# Patient Record
Sex: MALE | Race: WHITE | NOT HISPANIC OR LATINO | Employment: OTHER | URBAN - NONMETROPOLITAN AREA
[De-identification: names, ages, dates, MRNs, and addresses within clinical notes are randomized per-mention and may not be internally consistent; named-entity substitution may affect disease eponyms.]

---

## 2022-04-12 RX ORDER — ROSUVASTATIN CALCIUM 20 MG/1
TABLET, COATED ORAL
Qty: 30 TABLET | Refills: 0 | OUTPATIENT
Start: 2022-04-12

## 2022-04-20 RX ORDER — CYCLOBENZAPRINE HCL 10 MG
TABLET ORAL
Qty: 30 TABLET | Refills: 0 | OUTPATIENT
Start: 2022-04-20

## 2022-08-18 RX ORDER — OMEPRAZOLE 40 MG/1
CAPSULE, DELAYED RELEASE ORAL
Qty: 30 CAPSULE | Refills: 5 | Status: SHIPPED | OUTPATIENT
Start: 2022-08-18 | End: 2022-09-28

## 2022-08-22 RX ORDER — FLUOXETINE HYDROCHLORIDE 20 MG/1
CAPSULE ORAL
Qty: 90 CAPSULE | Refills: 1 | Status: SHIPPED | OUTPATIENT
Start: 2022-08-22 | End: 2022-11-15

## 2022-08-22 RX ORDER — LOSARTAN POTASSIUM AND HYDROCHLOROTHIAZIDE 12.5; 5 MG/1; MG/1
TABLET ORAL
Qty: 90 TABLET | Refills: 1 | Status: SHIPPED | OUTPATIENT
Start: 2022-08-22 | End: 2022-09-29

## 2022-09-13 RX ORDER — ROSUVASTATIN CALCIUM 20 MG/1
TABLET, COATED ORAL
Qty: 30 TABLET | Refills: 1 | Status: SHIPPED | OUTPATIENT
Start: 2022-09-13 | End: 2022-11-15

## 2022-09-28 RX ORDER — OMEPRAZOLE 40 MG/1
CAPSULE, DELAYED RELEASE ORAL
Qty: 30 CAPSULE | Refills: 5 | Status: SHIPPED | OUTPATIENT
Start: 2022-09-28

## 2022-09-29 RX ORDER — LOSARTAN POTASSIUM AND HYDROCHLOROTHIAZIDE 12.5; 5 MG/1; MG/1
TABLET ORAL
Qty: 30 TABLET | Refills: 0 | Status: SHIPPED | OUTPATIENT
Start: 2022-09-29

## 2022-10-31 RX ORDER — CYCLOBENZAPRINE HCL 10 MG
TABLET ORAL
Qty: 30 TABLET | Refills: 0 | OUTPATIENT
Start: 2022-10-31

## 2022-11-01 RX ORDER — ROSUVASTATIN CALCIUM 20 MG/1
TABLET, COATED ORAL
Qty: 30 TABLET | Refills: 0 | OUTPATIENT
Start: 2022-11-01

## 2022-11-15 RX ORDER — FLUOXETINE HYDROCHLORIDE 20 MG/1
CAPSULE ORAL
Qty: 90 CAPSULE | Refills: 0 | Status: SHIPPED | OUTPATIENT
Start: 2022-11-15 | End: 2022-12-23

## 2022-11-15 RX ORDER — ROSUVASTATIN CALCIUM 20 MG/1
TABLET, COATED ORAL
Qty: 30 TABLET | Refills: 0 | Status: SHIPPED | OUTPATIENT
Start: 2022-11-15 | End: 2022-11-28

## 2022-11-28 RX ORDER — ROSUVASTATIN CALCIUM 20 MG/1
TABLET, COATED ORAL
Qty: 30 TABLET | Refills: 3 | Status: SHIPPED | OUTPATIENT
Start: 2022-11-28

## 2022-12-13 RX ORDER — FLUOXETINE HYDROCHLORIDE 20 MG/1
CAPSULE ORAL
Qty: 90 CAPSULE | Refills: 0 | OUTPATIENT
Start: 2022-12-13

## 2022-12-23 RX ORDER — FLUOXETINE HYDROCHLORIDE 20 MG/1
CAPSULE ORAL
Qty: 90 CAPSULE | Refills: 0 | Status: SHIPPED | OUTPATIENT
Start: 2022-12-23 | End: 2023-01-12

## 2023-01-12 RX ORDER — FLUOXETINE HYDROCHLORIDE 20 MG/1
CAPSULE ORAL
Qty: 90 CAPSULE | Refills: 0 | Status: SHIPPED | OUTPATIENT
Start: 2023-01-12 | End: 2023-01-24

## 2023-01-24 RX ORDER — FLUOXETINE HYDROCHLORIDE 20 MG/1
CAPSULE ORAL
Qty: 90 CAPSULE | Refills: 0 | Status: SHIPPED | OUTPATIENT
Start: 2023-01-24 | End: 2023-03-13

## 2023-03-13 RX ORDER — FLUOXETINE HYDROCHLORIDE 20 MG/1
CAPSULE ORAL
Qty: 90 CAPSULE | Refills: 0 | Status: SHIPPED | OUTPATIENT
Start: 2023-03-13

## 2023-05-03 RX ORDER — FLUOXETINE HYDROCHLORIDE 20 MG/1
CAPSULE ORAL
Qty: 90 CAPSULE | Refills: 0 | OUTPATIENT
Start: 2023-05-03

## 2023-05-16 RX ORDER — LOSARTAN POTASSIUM AND HYDROCHLOROTHIAZIDE 12.5; 5 MG/1; MG/1
TABLET ORAL
Qty: 90 TABLET | Refills: 0 | OUTPATIENT
Start: 2023-05-16

## 2023-05-16 RX ORDER — ROSUVASTATIN CALCIUM 20 MG/1
TABLET, COATED ORAL
Qty: 30 TABLET | Refills: 0 | OUTPATIENT
Start: 2023-05-16

## 2023-05-22 RX ORDER — FLUOXETINE HYDROCHLORIDE 20 MG/1
CAPSULE ORAL
Qty: 90 CAPSULE | Refills: 0 | OUTPATIENT
Start: 2023-05-22

## 2023-05-22 RX ORDER — LOSARTAN POTASSIUM AND HYDROCHLOROTHIAZIDE 12.5; 5 MG/1; MG/1
1 TABLET ORAL DAILY
Qty: 30 TABLET | Refills: 0 | OUTPATIENT
Start: 2023-05-22

## 2023-05-22 NOTE — TELEPHONE ENCOUNTER
Caller: Dante Tan    Relationship: Self    Best call back number: [phone]     Requested Prescriptions:   Requested Prescriptions     Pending Prescriptions Disp Refills   • FLUoxetine (PROzac) 20 MG capsule 90 capsule 0   • losartan-hydrochlorothiazide (HYZAAR) 50-12.5 MG per tablet 30 tablet 0     Sig: Take 1 tablet by mouth Daily.        Pharmacy where request should be sent:  [address] [phone] [facility] [phone] [facility]      Last office visit with prescribing clinician: Visit date not found   Last telemedicine visit with prescribing clinician: 5/16/2023   Next office visit with prescribing clinician: Visit date not found     Additional details provided by patient: PATIENT IS OUT OF MEDICATION    Does the patient have less than a 3 day supply:  [x] Yes  [] No    Would you like a call back once the refill request has been completed: [x] Yes [] No    If the office needs to give you a call back, can they leave a voicemail: [x] Yes [] No    Faye Stiles Rep   05/22/23 13:43 EDT

## 2023-05-24 ENCOUNTER — OFFICE VISIT (OUTPATIENT)
Dept: FAMILY MEDICINE CLINIC | Facility: CLINIC | Age: 48
End: 2023-05-24
Payer: COMMERCIAL

## 2023-05-24 VITALS
SYSTOLIC BLOOD PRESSURE: 140 MMHG | DIASTOLIC BLOOD PRESSURE: 100 MMHG | TEMPERATURE: 97.7 F | HEIGHT: 72 IN | BODY MASS INDEX: 40.23 KG/M2 | WEIGHT: 297 LBS

## 2023-05-24 DIAGNOSIS — E78.2 MIXED HYPERLIPIDEMIA: ICD-10-CM

## 2023-05-24 DIAGNOSIS — Z13.29 THYROID DISORDER SCREENING: ICD-10-CM

## 2023-05-24 DIAGNOSIS — F32.A ANXIETY AND DEPRESSION: ICD-10-CM

## 2023-05-24 DIAGNOSIS — K21.9 GASTROESOPHAGEAL REFLUX DISEASE WITHOUT ESOPHAGITIS: ICD-10-CM

## 2023-05-24 DIAGNOSIS — Z13.1 DIABETES MELLITUS SCREENING: ICD-10-CM

## 2023-05-24 DIAGNOSIS — F41.9 ANXIETY AND DEPRESSION: ICD-10-CM

## 2023-05-24 DIAGNOSIS — I10 PRIMARY HYPERTENSION: Primary | ICD-10-CM

## 2023-05-24 RX ORDER — FLUOXETINE HYDROCHLORIDE 20 MG/1
CAPSULE ORAL
Qty: 240 CAPSULE | Refills: 1 | Status: SHIPPED | OUTPATIENT
Start: 2023-05-24

## 2023-05-24 RX ORDER — TRAZODONE HYDROCHLORIDE 50 MG/1
50 TABLET ORAL NIGHTLY
Qty: 30 TABLET | Refills: 1 | Status: SHIPPED | OUTPATIENT
Start: 2023-05-24

## 2023-05-24 RX ORDER — OMEPRAZOLE 40 MG/1
40 CAPSULE, DELAYED RELEASE ORAL DAILY
Qty: 90 CAPSULE | Refills: 1 | Status: SHIPPED | OUTPATIENT
Start: 2023-05-24

## 2023-05-24 RX ORDER — LOSARTAN POTASSIUM AND HYDROCHLOROTHIAZIDE 12.5; 5 MG/1; MG/1
1 TABLET ORAL DAILY
Qty: 90 TABLET | Refills: 1 | Status: SHIPPED | OUTPATIENT
Start: 2023-05-24

## 2023-05-24 NOTE — PROGRESS NOTES
Office Note     Name: Dante Tan    : 1975     MRN: [id number]     Chief Complaint  Hypertension    Subjective     History of Present Illness:  Dante Tan is a 47 y.o. male who presents today for eval of hypertension, hyperlipidemia, GERD, and anxiety and depression, which are chronic per patient history.  He states that he has been doing well on current therapy he has been out of his BP medication and Fluoxetine x 1 week.  He states that is his blood pressure is elevated today.  He denies any chest pain, headache, shortness of breath, or dizziness.  He states that he has a blood pressure cuff at home, but he has not been monitoring his blood pressure very often.  He states that when he has checked it, it has been normal.    He states that the fluoxetine does help with his depression and anxiety, but he admits that he has been having some irritability, especially in the evenings.  He states that he has not been sleeping as well either.  He admits that he smokes marijuana occasionally for sleep and mood, and it does help.    Review of Systems:   Review of Systems   Eyes: Negative for blurred vision.   Respiratory: Negative for shortness of breath.    Cardiovascular: Negative for chest pain and palpitations.   Musculoskeletal: Negative for neck pain.   Psychiatric/Behavioral: Positive for agitation and sleep disturbance. Negative for depressed mood. The patient is not nervous/anxious.        Past Medical History:   Past Medical History:   Diagnosis Date   • Hyperlipidemia    • Hypertension        Past Surgical History: History reviewed. No pertinent surgical history.    Family History: History reviewed. No pertinent family history.    Social History:   Social History     Socioeconomic History   • Marital status:    Tobacco Use   • Smoking status: Never   • Smokeless tobacco: Never   Vaping Use   • Vaping Use: Never used   Substance and Sexual Activity   • Alcohol use: Never   • Drug use:  "Yes     Types: Marijuana   • Sexual activity: Defer       Immunizations:   There is no immunization history on file for this patient.     Medications:     Current Outpatient Medications:   •  FLUoxetine (PROzac) 20 MG capsule, Take 3 capsules by mouth once daily in the morning for mood-depression, Disp: 240 capsule, Rfl: 1  •  losartan-hydrochlorothiazide (HYZAAR) 50-12.5 MG per tablet, Take 1 tablet by mouth Daily., Disp: 90 tablet, Rfl: 1  •  omeprazole (priLOSEC) 40 MG capsule, Take 1 capsule by mouth Daily. before a meal, Disp: 90 capsule, Rfl: 1  •  rosuvastatin (CRESTOR) 20 MG tablet, Take 1 tablet by mouth once daily, Disp: 30 tablet, Rfl: 3  •  traZODone (DESYREL) 50 MG tablet, Take 1 tablet by mouth Every Night. May start with 1/2 tab x 1-2 weeks., Disp: 30 tablet, Rfl: 1    Allergies:   Allergies   Allergen Reactions   • Celebrex [Celecoxib] Unknown - Low Severity   • Cymbalta [Duloxetine Hcl] Unknown - Low Severity   • Lisinopril Unknown - Low Severity       Objective     Vital Signs  /100 (BP Location: Right arm, Patient Position: Sitting, Cuff Size: Large Adult)   Temp 97.7 °F (36.5 °C) (Temporal)   Ht 182.9 cm (72\")   Wt 135 kg (297 lb)   BMI 40.28 kg/m²   Estimated body mass index is 40.28 kg/m² as calculated from the following:    Height as of this encounter: 182.9 cm (72\").    Weight as of this encounter: 135 kg (297 lb).          Physical Exam  Vitals and nursing note reviewed.   Constitutional:       General: He is not in acute distress.     Appearance: Normal appearance. He is obese. He is not ill-appearing or diaphoretic.   HENT:      Head: Normocephalic and atraumatic.      Mouth/Throat:      Mouth: Mucous membranes are moist.      Pharynx: Oropharynx is clear.   Eyes:      Extraocular Movements: Extraocular movements intact.      Conjunctiva/sclera: Conjunctivae normal.      Pupils: Pupils are equal, round, and reactive to light.   Cardiovascular:      Rate and Rhythm: Normal rate " and regular rhythm.      Pulses: Normal pulses.      Heart sounds: Normal heart sounds.   Pulmonary:      Effort: Pulmonary effort is normal. No respiratory distress.      Breath sounds: Normal breath sounds.   Skin:     General: Skin is warm and dry.   Neurological:      General: No focal deficit present.      Mental Status: He is alert and oriented to person, place, and time.      Motor: No weakness.      Coordination: Coordination normal.   Psychiatric:         Mood and Affect: Mood normal.         Behavior: Behavior normal.         Thought Content: Thought content normal.         Judgment: Judgment normal.       Assessment and Plan     Assessment/Plan:  Diagnoses and all orders for this visit:    1. Primary hypertension (Primary)  Assessment & Plan:  Hypertension is not controlled on today's visit, but he has been out of his medication for his blood pressure and his duloxetine..  Weight loss.  Regular aerobic exercise.  Continue current medications.  Ambulatory blood pressure monitoring.  Blood pressure will be reassessed in 4 weeks when he does a check-in with his blood pressure log..    Orders:  -     losartan-hydrochlorothiazide (HYZAAR) 50-12.5 MG per tablet; Take 1 tablet by mouth Daily.  Dispense: 90 tablet; Refill: 1  -     CBC Auto Differential; Future  -     Comprehensive Metabolic Panel; Future  -     Magnesium; Future  -     CBC Auto Differential  -     Comprehensive Metabolic Panel  -     Magnesium  -     Comprehensive Metabolic Panel  -     CBC & Differential  -     Magnesium  -     Iron Profile  -     Ferritin  -     Specimen Status Report    2. Anxiety and depression  Assessment & Plan:  Patient's depression is recurrent and is moderate without psychosis. Their depression is currently active and the condition is improving with treatment. This will be reassessed at the next regular appointment. F/U as described:patient will continue current medication therapy and I will add trazodone in the evening  to help with additional symptoms and sleep.  I encouraged him to return in 4 to 8 weeks to check in on medication efficacy.  I advised him that there are other things we can try to add on if is not helpful.  He expressed understanding and is in agreement with the plan.    Orders:  -     FLUoxetine (PROzac) 20 MG capsule; Take 3 capsules by mouth once daily in the morning for mood-depression  Dispense: 240 capsule; Refill: 1  -     Thyroid Cascade Profile; Future  -     traZODone (DESYREL) 50 MG tablet; Take 1 tablet by mouth Every Night. May start with 1/2 tab x 1-2 weeks.  Dispense: 30 tablet; Refill: 1  -     Thyroid [place] Profile    3. Mixed hyperlipidemia  Assessment & Plan:  Lipid abnormalities are going to be checked on today's labs.  I recommend that he continue current therapy.  Lipids will be reassessed in 6 months.    Orders:  -     Lipid Panel; Future  -     Lipid Panel  -     Lipid Panel    4. Gastroesophageal reflux disease without esophagitis  Assessment & Plan:  He has been controlled on current dose of medication for a long time.  We discussed the potential benefits and decreased risk to his kidneys if he were able to come off of this medication.  We discussed that he could develop rebound reflux symptoms if he did not taper down appropriately.  He agrees to try to decrease to the 20 mg to see if his symptoms are still under control.  He may continue to taper if he is still well controlled after being on the 20 mg for a while.    Orders:  -     omeprazole (priLOSEC) 40 MG capsule; Take 1 capsule by mouth Daily. before a meal  Dispense: 90 capsule; Refill: 1    5. Thyroid disorder screening  -     Thyroid Cascade Profile    6. Diabetes mellitus screening  -     Hemoglobin A1c; Future  -     Hemoglobin A1c  -     Hemoglobin A1c      Follow Up  Return in about 6 months (around 11/24/2023) for Annual physical.    Shahnaz Hawkins PA-C  [facility] Internal Medicine [facility]

## 2023-05-25 LAB
ALBUMIN SERPL-MCNC: 4.4 G/DL (ref 4–5)
ALBUMIN/GLOB SERPL: 1.8 {RATIO} (ref 1.2–2.2)
ALP SERPL-CCNC: 56 IU/L (ref 44–121)
ALT SERPL-CCNC: 32 IU/L (ref 0–44)
AST SERPL-CCNC: 30 IU/L (ref 0–40)
BASOPHILS # BLD AUTO: 0 X10E3/UL (ref 0–0.2)
BASOPHILS NFR BLD AUTO: 0 %
BILIRUB SERPL-MCNC: 0.4 MG/DL (ref 0–1.2)
BUN SERPL-MCNC: 17 MG/DL (ref 6–24)
BUN/CREAT SERPL: 17 (ref 9–20)
CALCIUM SERPL-MCNC: 8.9 MG/DL (ref 8.7–10.2)
CHLORIDE SERPL-SCNC: 103 MMOL/L (ref 96–106)
CHOLEST SERPL-MCNC: 160 MG/DL (ref 100–199)
CO2 SERPL-SCNC: 22 MMOL/L (ref 20–29)
CREAT SERPL-MCNC: 1.02 MG/DL (ref 0.76–1.27)
EGFRCR SERPLBLD CKD-EPI 2021: 90 ML/MIN/1.73
EOSINOPHIL # BLD AUTO: 0.2 X10E3/UL (ref 0–0.4)
EOSINOPHIL NFR BLD AUTO: 3 %
ERYTHROCYTE [DISTWIDTH] IN BLOOD BY AUTOMATED COUNT: 12.7 % (ref 11.6–15.4)
GLOBULIN SER CALC-MCNC: 2.4 G/DL (ref 1.5–4.5)
GLUCOSE SERPL-MCNC: 91 MG/DL (ref 70–99)
HBA1C MFR BLD: 5.8 % (ref 4.8–5.6)
HCT VFR BLD AUTO: 36.5 % (ref 37.5–51)
HDLC SERPL-MCNC: 49 MG/DL
HGB BLD-MCNC: 12 G/DL (ref 13–17.7)
IMM GRANULOCYTES # BLD AUTO: 0 X10E3/UL (ref 0–0.1)
IMM GRANULOCYTES NFR BLD AUTO: 0 %
LDLC SERPL CALC-MCNC: 97 MG/DL (ref 0–99)
LYMPHOCYTES # BLD AUTO: 1.1 X10E3/UL (ref 0.7–3.1)
LYMPHOCYTES NFR BLD AUTO: 24 %
MAGNESIUM SERPL-MCNC: 2.1 MG/DL (ref 1.6–2.3)
MCH RBC QN AUTO: 30.5 PG (ref 26.6–33)
MCHC RBC AUTO-ENTMCNC: 32.9 G/DL (ref 31.5–35.7)
MCV RBC AUTO: 93 FL (ref 79–97)
MONOCYTES # BLD AUTO: 0.4 X10E3/UL (ref 0.1–0.9)
MONOCYTES NFR BLD AUTO: 9 %
NEUTROPHILS # BLD AUTO: 2.9 X10E3/UL (ref 1.4–7)
NEUTROPHILS NFR BLD AUTO: 64 %
PLATELET # BLD AUTO: 202 X10E3/UL (ref 150–450)
POTASSIUM SERPL-SCNC: 4.7 MMOL/L (ref 3.5–5.2)
PROT SERPL-MCNC: 6.8 G/DL (ref 6–8.5)
RBC # BLD AUTO: 3.94 X10E6/UL (ref 4.14–5.8)
SODIUM SERPL-SCNC: 139 MMOL/L (ref 134–144)
TRIGL SERPL-MCNC: 70 MG/DL (ref 0–149)
TSH SERPL DL<=0.005 MIU/L-ACNC: 1.68 UIU/ML (ref 0.45–4.5)
VLDLC SERPL CALC-MCNC: 14 MG/DL (ref 5–40)
WBC # BLD AUTO: 4.5 X10E3/UL (ref 3.4–10.8)

## 2023-05-26 LAB
FERRITIN SERPL-MCNC: 87 NG/ML (ref 30–400)
IRON SATN MFR SERPL: 20 % (ref 15–55)
IRON SERPL-MCNC: 61 UG/DL (ref 38–169)
SPECIMEN STATUS: NORMAL
TIBC SERPL-MCNC: 299 UG/DL (ref 250–450)
UIBC SERPL-MCNC: 238 UG/DL (ref 111–343)

## 2023-05-28 NOTE — ASSESSMENT & PLAN NOTE
Hypertension is not controlled on today's visit, but he has been out of his medication for his blood pressure and his duloxetine..  Weight loss.  Regular aerobic exercise.  Continue current medications.  Ambulatory blood pressure monitoring.  Blood pressure will be reassessed in 4 weeks when he does a check-in with his blood pressure log..

## 2023-05-28 NOTE — ASSESSMENT & PLAN NOTE
He has been controlled on current dose of medication for a long time.  We discussed the potential benefits and decreased risk to his kidneys if he were able to come off of this medication.  We discussed that he could develop rebound reflux symptoms if he did not taper down appropriately.  He agrees to try to decrease to the 20 mg to see if his symptoms are still under control.  He may continue to taper if he is still well controlled after being on the 20 mg for a while.

## 2023-05-28 NOTE — ASSESSMENT & PLAN NOTE
Patient's depression is recurrent and is moderate without psychosis. Their depression is currently active and the condition is improving with treatment. This will be reassessed at the next regular appointment. F/U as described:patient will continue current medication therapy and I will add trazodone in the evening to help with additional symptoms and sleep.  I encouraged him to return in 4 to 8 weeks to check in on medication efficacy.  I advised him that there are other things we can try to add on if is not helpful.  He expressed understanding and is in agreement with the plan.

## 2023-05-28 NOTE — ASSESSMENT & PLAN NOTE
Lipid abnormalities are going to be checked on today's labs.  I recommend that he continue current therapy.  Lipids will be reassessed in 6 months.

## 2023-08-02 RX ORDER — ROSUVASTATIN CALCIUM 20 MG/1
TABLET, COATED ORAL
Qty: 30 TABLET | Refills: 0 | Status: SHIPPED | OUTPATIENT
Start: 2023-08-02

## 2023-09-05 RX ORDER — ROSUVASTATIN CALCIUM 20 MG/1
TABLET, COATED ORAL
Qty: 30 TABLET | Refills: 0 | Status: SHIPPED | OUTPATIENT
Start: 2023-09-05

## 2023-10-22 DIAGNOSIS — F41.9 ANXIETY AND DEPRESSION: ICD-10-CM

## 2023-10-22 DIAGNOSIS — F32.A ANXIETY AND DEPRESSION: ICD-10-CM

## 2023-10-23 RX ORDER — FLUOXETINE HYDROCHLORIDE 20 MG/1
CAPSULE ORAL
Qty: 240 CAPSULE | Refills: 0 | Status: SHIPPED | OUTPATIENT
Start: 2023-10-23

## 2023-10-30 RX ORDER — ROSUVASTATIN CALCIUM 20 MG/1
TABLET, COATED ORAL
Qty: 30 TABLET | Refills: 0 | Status: SHIPPED | OUTPATIENT
Start: 2023-10-30

## 2023-11-24 DIAGNOSIS — I10 PRIMARY HYPERTENSION: ICD-10-CM

## 2023-11-27 ENCOUNTER — OFFICE VISIT (OUTPATIENT)
Dept: FAMILY MEDICINE CLINIC | Facility: CLINIC | Age: 48
End: 2023-11-27
Payer: COMMERCIAL

## 2023-11-27 VITALS
SYSTOLIC BLOOD PRESSURE: 118 MMHG | WEIGHT: 281.7 LBS | HEART RATE: 63 BPM | OXYGEN SATURATION: 95 % | DIASTOLIC BLOOD PRESSURE: 82 MMHG | HEIGHT: 72 IN | BODY MASS INDEX: 38.16 KG/M2

## 2023-11-27 DIAGNOSIS — I10 PRIMARY HYPERTENSION: ICD-10-CM

## 2023-11-27 DIAGNOSIS — E78.2 MIXED HYPERLIPIDEMIA: ICD-10-CM

## 2023-11-27 DIAGNOSIS — Z12.11 COLON CANCER SCREENING: ICD-10-CM

## 2023-11-27 DIAGNOSIS — G47.00 INSOMNIA, UNSPECIFIED TYPE: ICD-10-CM

## 2023-11-27 DIAGNOSIS — K21.9 GASTROESOPHAGEAL REFLUX DISEASE WITHOUT ESOPHAGITIS: ICD-10-CM

## 2023-11-27 DIAGNOSIS — Z12.5 PROSTATE CANCER SCREENING: ICD-10-CM

## 2023-11-27 DIAGNOSIS — F32.A ANXIETY AND DEPRESSION: ICD-10-CM

## 2023-11-27 DIAGNOSIS — E66.01 CLASS 2 SEVERE OBESITY DUE TO EXCESS CALORIES WITH SERIOUS COMORBIDITY AND BODY MASS INDEX (BMI) OF 38.0 TO 38.9 IN ADULT: ICD-10-CM

## 2023-11-27 DIAGNOSIS — Z11.59 NEED FOR HEPATITIS C SCREENING TEST: ICD-10-CM

## 2023-11-27 DIAGNOSIS — F41.9 ANXIETY AND DEPRESSION: ICD-10-CM

## 2023-11-27 DIAGNOSIS — Z00.00 GENERAL MEDICAL EXAM: Primary | ICD-10-CM

## 2023-11-27 PROBLEM — E66.812 CLASS 2 SEVERE OBESITY DUE TO EXCESS CALORIES WITH SERIOUS COMORBIDITY AND BODY MASS INDEX (BMI) OF 38.0 TO 38.9 IN ADULT: Status: ACTIVE | Noted: 2023-11-27

## 2023-11-27 RX ORDER — LOSARTAN POTASSIUM AND HYDROCHLOROTHIAZIDE 12.5; 5 MG/1; MG/1
1 TABLET ORAL DAILY
Qty: 90 TABLET | Refills: 0 | Status: SHIPPED | OUTPATIENT
Start: 2023-11-27

## 2023-11-27 RX ORDER — LOSARTAN POTASSIUM AND HYDROCHLOROTHIAZIDE 12.5; 5 MG/1; MG/1
1 TABLET ORAL DAILY
Qty: 90 TABLET | Refills: 0 | Status: SHIPPED | OUTPATIENT
Start: 2023-11-27 | End: 2023-11-27 | Stop reason: SDUPTHER

## 2023-11-27 RX ORDER — FLUOXETINE HYDROCHLORIDE 20 MG/1
CAPSULE ORAL
Qty: 240 CAPSULE | Refills: 0 | Status: SHIPPED | OUTPATIENT
Start: 2023-11-27

## 2023-11-27 RX ORDER — ROSUVASTATIN CALCIUM 20 MG/1
20 TABLET, COATED ORAL DAILY
Qty: 90 TABLET | Refills: 1 | Status: SHIPPED | OUTPATIENT
Start: 2023-11-27

## 2023-11-27 RX ORDER — OMEPRAZOLE 40 MG/1
40 CAPSULE, DELAYED RELEASE ORAL DAILY
Qty: 90 CAPSULE | Refills: 1 | Status: SHIPPED | OUTPATIENT
Start: 2023-11-27

## 2023-11-27 NOTE — ASSESSMENT & PLAN NOTE
The trazodone has not been effective in helping him sleep.  I recommend that he consider a sleep study or seeing the sleep specialist.  He states that he will think about it, but he declines at this time.

## 2023-11-27 NOTE — ASSESSMENT & PLAN NOTE
Hyperlipidemia is chronic per patient history.  Level will be checked on today's labs, and he will continue current medication.

## 2023-11-27 NOTE — PROGRESS NOTES
Male Physical Note      Date: 2023   Patient Name: Dante Tan  : 1975   MRN: [id number]     Chief Complaint:    Chief Complaint   Patient presents with    Annual Exam       History of Present Illness: Dante Tan is a 47 y.o. male who is here today for their annual health maintenance and physical.  He denies any acute complaints at this time.  He states that he has been doing well on his current medications.      Subjective      Review of Systems:   Review of Systems   Constitutional:  Negative for activity change, appetite change, fatigue, unexpected weight gain and unexpected weight loss.   HENT:  Positive for rhinorrhea. Negative for congestion, ear discharge, ear pain, sneezing and sore throat.    Respiratory:  Negative for apnea, cough and shortness of breath.    Cardiovascular:  Negative for chest pain and palpitations.   Gastrointestinal:  Negative for abdominal distention, abdominal pain, blood in stool, diarrhea, nausea, vomiting, GERD and indigestion.   Allergic/Immunologic: Positive for environmental allergies.   Psychiatric/Behavioral:  Positive for sleep disturbance. Negative for depressed mood. The patient is not nervous/anxious.        Past Medical History, Social History, Family History and Care Team were all reviewed with patient and updated as appropriate.     Medications:     Current Outpatient Medications:     FLUoxetine (PROzac) 20 MG capsule, Take 3 capsules by mouth once daily in the morning for mood-depression, Disp: 240 capsule, Rfl: 0    losartan-hydrochlorothiazide (HYZAAR) 50-12.5 MG per tablet, Take 1 tablet by mouth Daily., Disp: 90 tablet, Rfl: 0    omeprazole (priLOSEC) 40 MG capsule, Take 1 capsule by mouth Daily. before a meal, Disp: 90 capsule, Rfl: 1    rosuvastatin (CRESTOR) 20 MG tablet, Take 1 tablet by mouth Daily., Disp: 90 tablet, Rfl: 1    Allergies:   Allergies   Allergen Reactions    Celebrex [Celecoxib] Unknown - Low Severity    Cymbalta  "[Duloxetine Hcl] Unknown - Low Severity    Lisinopril Unknown - Low Severity       Immunizations:  Td/Tdap(Booster Q 10 yrs):  2015  Flu (Yearly):  Declines    Colorectal Screening:  No previous screening    CT for Smoker (Age 55-75, 30pk yr):  N/A  Hep C ( [id number]): No known previous screening.  PSA(Over age 50):  No known previous screening.      Diet/Physical activity:  No specific diet, tries to eat protein  Tries to monitor grease and salt intake  Cooks his own meals    Caffeine: 3-4 cups of coffee    Exercise: No formal exercise, active job    Sexual health: Denies concerns    PHQ-2 Depression Screening  Little interest or pleasure in doing things? 0-->not at all   Feeling down, depressed, or hopeless? 0-->not at all   PHQ-2 Total Score 0       Intimate partner violence: Denies concerns.    Objective     Physical Exam:  Vital Signs:   Vitals:    23 1032 23 1111   BP: 120/90 118/82   BP Location: Right arm Right arm   Patient Position: Sitting Sitting   Cuff Size: Large Adult Large Adult   Pulse: 63    SpO2: 95%    Weight: 128 kg (281 lb 11.2 oz)    Height: 182.9 cm (72\")      Body mass index is 38.21 kg/m².     Physical Exam  Vitals and nursing note reviewed.   Constitutional:       General: He is not in acute distress.     Appearance: Normal appearance. He is obese. He is not ill-appearing or diaphoretic.   HENT:      Head: Normocephalic and atraumatic.      Mouth/Throat:      Mouth: Mucous membranes are moist.      Pharynx: Oropharynx is clear.   Eyes:      Extraocular Movements: Extraocular movements intact.      Conjunctiva/sclera: Conjunctivae normal.      Pupils: Pupils are equal, round, and reactive to light.   Cardiovascular:      Rate and Rhythm: Normal rate and regular rhythm.      Pulses: Normal pulses.      Heart sounds: Normal heart sounds.   Pulmonary:      Effort: Pulmonary effort is normal. No respiratory distress.      Breath sounds: Normal breath sounds.   Skin:     General: " Skin is warm and dry.   Neurological:      General: No focal deficit present.      Mental Status: He is alert and oriented to person, place, and time.      Motor: No weakness.      Coordination: Coordination normal.   Psychiatric:         Mood and Affect: Mood normal.         Behavior: Behavior normal.         Thought Content: Thought content normal.         Judgment: Judgment normal.       Assessment / Plan      Assessment/Plan:   Diagnoses and all orders for this visit:    1. General medical exam (Primary)    2. Mixed hyperlipidemia  Assessment & Plan:  Hyperlipidemia is chronic per patient history.  Level will be checked on today's labs, and he will continue current medication.    Orders:  -     Lipid Panel; Future  -     rosuvastatin (CRESTOR) 20 MG tablet; Take 1 tablet by mouth Daily.  Dispense: 90 tablet; Refill: 1    3. Primary hypertension  Assessment & Plan:  Hypertension is improving with treatment.  Continue current medications.  Blood pressure will be reassessed at the next regular appointment.    Orders:  -     CBC Auto Differential; Future  -     Comprehensive Metabolic Panel; Future  -     losartan-hydrochlorothiazide (HYZAAR) 50-12.5 MG per tablet; Take 1 tablet by mouth Daily.  Dispense: 90 tablet; Refill: 0  -     Magnesium; Future    4. Anxiety and depression  Assessment & Plan:  Patient's depression and anxiety are currently controlled on medications.  We will continue current therapy.    Orders:  -     FLUoxetine (PROzac) 20 MG capsule; Take 3 capsules by mouth once daily in the morning for mood-depression  Dispense: 240 capsule; Refill: 0    5. Gastroesophageal reflux disease without esophagitis  Assessment & Plan:  His GERD is chronic and well-controlled on omeprazole.  I will continue current medication.    Orders:  -     omeprazole (priLOSEC) 40 MG capsule; Take 1 capsule by mouth Daily. before a meal  Dispense: 90 capsule; Refill: 1    6. Class 2 severe obesity due to excess calories with  serious comorbidity and body mass index (BMI) of 38.0 to 38.9 in adult  Assessment & Plan:  Patient's (Body mass index is 38.21 kg/m².) indicates that they are obese (BMI >30) with health conditions that include hypertension and dyslipidemias . Weight is improving with lifestyle modifications. BMI  is above average; BMI management plan is completed. We discussed portion control and increasing exercise.       7. Insomnia, unspecified type  Assessment & Plan:  The trazodone has not been effective in helping him sleep.  I recommend that he consider a sleep study or seeing the sleep specialist.  He states that he will think about it, but he declines at this time.      8. Need for hepatitis C screening test  -     HCV Antibody Rfx To Qnt PCR; Future    9. Colon cancer screening  Comments:  He declines colonoscopy, but he does agree to do Cologuard.  Order sent.  Orders:  -     Cologuard - Stool, Per Rectum; Future    10. Prostate cancer screening  -     PSA Screen; Future        Follow Up:   Return in about 6 months (around 5/27/2024) for next scheduled follow up.    Healthcare Maintenance:   Discussed injury prevention, diet and exercise, safe sexual practices, and screening for common diseases. Encouraged use of sunscreen and seatbelts. Discussed timing of colon cancer cancer screening, prostate cancer screening, and review of skin for lesions. Avoidance of tobacco encouraged. Limitation or avoidance of alcohol encouraged. Recommend yearly dental and eye exams. Also discussed monitoring of blood pressure and lipids.   Dante Tan voices understanding and acceptance of this advice and will call back with any further questions or concerns. AVS with preventive healthcare tips printed for patient.     Shahnaz Hawkins PA-C  [facility] Internal Medicine [facility]

## 2023-11-27 NOTE — ASSESSMENT & PLAN NOTE
Patient's (Body mass index is 38.21 kg/m².) indicates that they are obese (BMI >30) with health conditions that include hypertension and dyslipidemias . Weight is improving with lifestyle modifications. BMI  is above average; BMI management plan is completed. We discussed portion control and increasing exercise.

## 2023-11-27 NOTE — ASSESSMENT & PLAN NOTE
Patient's depression and anxiety are currently controlled on medications.  We will continue current therapy.

## 2023-12-18 ENCOUNTER — LAB (OUTPATIENT)
Dept: FAMILY MEDICINE CLINIC | Facility: CLINIC | Age: 48
End: 2023-12-18
Payer: COMMERCIAL

## 2023-12-18 DIAGNOSIS — E78.2 MIXED HYPERLIPIDEMIA: ICD-10-CM

## 2023-12-18 DIAGNOSIS — Z11.59 NEED FOR HEPATITIS C SCREENING TEST: ICD-10-CM

## 2023-12-18 DIAGNOSIS — I10 PRIMARY HYPERTENSION: ICD-10-CM

## 2023-12-18 DIAGNOSIS — Z12.5 PROSTATE CANCER SCREENING: ICD-10-CM

## 2023-12-18 PROCEDURE — 36415 COLL VENOUS BLD VENIPUNCTURE: CPT | Performed by: PHYSICIAN ASSISTANT

## 2023-12-19 LAB
ALBUMIN SERPL-MCNC: 4.2 G/DL (ref 4.1–5.1)
ALBUMIN/GLOB SERPL: 1.4 {RATIO} (ref 1.2–2.2)
ALP SERPL-CCNC: 56 IU/L (ref 44–121)
ALT SERPL-CCNC: 16 IU/L (ref 0–44)
AST SERPL-CCNC: 20 IU/L (ref 0–40)
BASOPHILS # BLD AUTO: 0 X10E3/UL (ref 0–0.2)
BASOPHILS NFR BLD AUTO: 1 %
BILIRUB SERPL-MCNC: 0.3 MG/DL (ref 0–1.2)
BUN SERPL-MCNC: 15 MG/DL (ref 6–24)
BUN/CREAT SERPL: 15 (ref 9–20)
CALCIUM SERPL-MCNC: 9.2 MG/DL (ref 8.7–10.2)
CHLORIDE SERPL-SCNC: 101 MMOL/L (ref 96–106)
CHOLEST SERPL-MCNC: 168 MG/DL (ref 100–199)
CO2 SERPL-SCNC: 23 MMOL/L (ref 20–29)
CREAT SERPL-MCNC: 1 MG/DL (ref 0.76–1.27)
EGFRCR SERPLBLD CKD-EPI 2021: 92 ML/MIN/1.73
EOSINOPHIL # BLD AUTO: 0.3 X10E3/UL (ref 0–0.4)
EOSINOPHIL NFR BLD AUTO: 7 %
ERYTHROCYTE [DISTWIDTH] IN BLOOD BY AUTOMATED COUNT: 14.2 % (ref 11.6–15.4)
GLOBULIN SER CALC-MCNC: 3 G/DL (ref 1.5–4.5)
GLUCOSE SERPL-MCNC: 90 MG/DL (ref 70–99)
HCT VFR BLD AUTO: 39.6 % (ref 37.5–51)
HCV AB SERPL QL IA: NORMAL
HCV IGG SERPL QL IA: NON REACTIVE
HDLC SERPL-MCNC: 53 MG/DL
HGB BLD-MCNC: 12.8 G/DL (ref 13–17.7)
IMM GRANULOCYTES # BLD AUTO: 0 X10E3/UL (ref 0–0.1)
IMM GRANULOCYTES NFR BLD AUTO: 0 %
LDLC SERPL CALC-MCNC: 102 MG/DL (ref 0–99)
LYMPHOCYTES # BLD AUTO: 1.3 X10E3/UL (ref 0.7–3.1)
LYMPHOCYTES NFR BLD AUTO: 29 %
MAGNESIUM SERPL-MCNC: 2.3 MG/DL (ref 1.6–2.3)
MCH RBC QN AUTO: 29.7 PG (ref 26.6–33)
MCHC RBC AUTO-ENTMCNC: 32.3 G/DL (ref 31.5–35.7)
MCV RBC AUTO: 92 FL (ref 79–97)
MONOCYTES # BLD AUTO: 0.4 X10E3/UL (ref 0.1–0.9)
MONOCYTES NFR BLD AUTO: 9 %
NEUTROPHILS # BLD AUTO: 2.3 X10E3/UL (ref 1.4–7)
NEUTROPHILS NFR BLD AUTO: 54 %
PLATELET # BLD AUTO: 211 X10E3/UL (ref 150–450)
POTASSIUM SERPL-SCNC: 4.5 MMOL/L (ref 3.5–5.2)
PROT SERPL-MCNC: 7.2 G/DL (ref 6–8.5)
PSA SERPL-MCNC: 0.3 NG/ML (ref 0–4)
RBC # BLD AUTO: 4.31 X10E6/UL (ref 4.14–5.8)
SODIUM SERPL-SCNC: 138 MMOL/L (ref 134–144)
TRIGL SERPL-MCNC: 65 MG/DL (ref 0–149)
VLDLC SERPL CALC-MCNC: 13 MG/DL (ref 5–40)
WBC # BLD AUTO: 4.3 X10E3/UL (ref 3.4–10.8)

## 2024-04-10 DIAGNOSIS — F32.A ANXIETY AND DEPRESSION: ICD-10-CM

## 2024-04-10 DIAGNOSIS — F41.9 ANXIETY AND DEPRESSION: ICD-10-CM

## 2024-04-10 RX ORDER — FLUOXETINE HYDROCHLORIDE 20 MG/1
CAPSULE ORAL
Qty: 240 CAPSULE | Refills: 0 | Status: SHIPPED | OUTPATIENT
Start: 2024-04-10

## 2024-05-28 ENCOUNTER — OFFICE VISIT (OUTPATIENT)
Dept: FAMILY MEDICINE CLINIC | Facility: CLINIC | Age: 49
End: 2024-05-28
Payer: COMMERCIAL

## 2024-05-28 VITALS
HEIGHT: 72 IN | DIASTOLIC BLOOD PRESSURE: 82 MMHG | BODY MASS INDEX: 36.57 KG/M2 | SYSTOLIC BLOOD PRESSURE: 120 MMHG | HEART RATE: 70 BPM | WEIGHT: 270 LBS | OXYGEN SATURATION: 98 %

## 2024-05-28 DIAGNOSIS — M79.674 TOE PAIN, RIGHT: ICD-10-CM

## 2024-05-28 DIAGNOSIS — M25.531 PAIN IN BOTH WRISTS: ICD-10-CM

## 2024-05-28 DIAGNOSIS — F41.9 ANXIETY AND DEPRESSION: ICD-10-CM

## 2024-05-28 DIAGNOSIS — K21.9 GASTROESOPHAGEAL REFLUX DISEASE WITHOUT ESOPHAGITIS: ICD-10-CM

## 2024-05-28 DIAGNOSIS — M79.641 BILATERAL HAND PAIN: ICD-10-CM

## 2024-05-28 DIAGNOSIS — F32.A ANXIETY AND DEPRESSION: ICD-10-CM

## 2024-05-28 DIAGNOSIS — M79.642 BILATERAL HAND PAIN: ICD-10-CM

## 2024-05-28 DIAGNOSIS — E78.2 MIXED HYPERLIPIDEMIA: ICD-10-CM

## 2024-05-28 DIAGNOSIS — I10 PRIMARY HYPERTENSION: ICD-10-CM

## 2024-05-28 DIAGNOSIS — M25.532 PAIN IN BOTH WRISTS: ICD-10-CM

## 2024-05-28 PROBLEM — M20.41 HAMMERTOE OF RIGHT FOOT: Status: ACTIVE | Noted: 2024-05-28

## 2024-05-28 PROCEDURE — 99214 OFFICE O/P EST MOD 30 MIN: CPT | Performed by: PHYSICIAN ASSISTANT

## 2024-05-28 NOTE — PROGRESS NOTES
Office Note     Name: Dante Tan    : 1975     MRN: [id number]     Chief Complaint  Hypertension, Hyperlipidemia, and Hand Pain    Subjective     History of Present Illness:  Dante Tan is a 48 y.o. male who presents today for eval of hyperlipidemia, hypertension, and anxiety and depression, which are chronic per patient history.  He states that his levels have been stable, and he has been tolerating his current medications well.    He c/o B hand pain, which is worse in the L hand.  He states that he has had surgery on his right wrist, and he continues to have restricted movement and weakness in that side.  He has developed problems with his left wrist and hand more recently, and he denies any new injury.  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 He states that both hands feel weak. He denies any numbness or tingling, but he states that he does have some shooting pain at times. He has not been taking any anti-inflammatory medication because he was previously advised to stop it.  He has tried just about every cream over-the-counter including blue emu, Biofreeze, Aspercreme, and capsaicin.  He states that nothing has helped.    Review of Systems:   Review of Systems   All other systems reviewed and are negative.      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    Visual impairment        Past Surgical History:   Past Surgical History:   Procedure Laterality Date    CHOLECYSTECTOMY         Family History:   Family History   Problem Relation Age of Onset    Arthritis Father     Cancer Father        Social History:   Social History     Socioeconomic History    Marital status:    Tobacco Use    Smoking status: Former     Current packs/day: 0.00     Average packs/day:  "0.3 packs/day for 2.0 years (0.5 ttl pk-yrs)     Types: Cigarettes     Start date:      Quit date:      Years since quittin.4    Smokeless tobacco: Never   Vaping Use    Vaping status: Never Used   Substance and Sexual Activity    Alcohol use: Not Currently     Comment: Recovering    Drug use: Yes     Types: Marijuana    Sexual activity: Not Currently     Partners: Female     Birth control/protection: None, Birth control pill     Comment: Spouse takes b/c pill       Immunizations:   There is no immunization history on file for this patient.     Medications:     Current Outpatient Medications:     FLUoxetine (PROzac) 20 MG capsule, TAKE 3 CAPSULES BY MOUTH ONCE DAILY IN THE MORNING FOR  MOOD/DEPRESSION, Disp: 240 capsule, Rfl: 0    losartan-hydrochlorothiazide (HYZAAR) 50-12.5 MG per tablet, Take 1 tablet by mouth Daily., Disp: 90 tablet, Rfl: 0    omeprazole (priLOSEC) 40 MG capsule, Take 1 capsule by mouth Daily. before a meal, Disp: 90 capsule, Rfl: 1    rosuvastatin (CRESTOR) 20 MG tablet, Take 1 tablet by mouth Daily., Disp: 90 tablet, Rfl: 1    Diclofenac Sodium (VOLTAREN) 1 % gel gel, Apply 4 g topically to the appropriate area as directed 4 (Four) Times a Day As Needed (joint pain)., Disp: 100 g, Rfl: 1    Allergies:   Allergies   Allergen Reactions    Celebrex [Celecoxib] Unknown - Low Severity    Cymbalta [Duloxetine Hcl] Unknown - Low Severity    Lisinopril Unknown - Low Severity       Objective     Vital Signs  /82 (BP Location: Right arm, Patient Position: Sitting, Cuff Size: Large Adult)   Pulse 70   Ht 182.9 cm (72\")   Wt 122 kg (270 lb)   SpO2 98%   BMI 36.62 kg/m²   Estimated body mass index is 36.62 kg/m² as calculated from the following:    Height as of this encounter: 182.9 cm (72\").    Weight as of this encounter: 122 kg (270 lb).      Physical Exam  Vitals and nursing note reviewed.   Constitutional:       General: He is not in acute distress.     Appearance: Normal " appearance. He is obese. He is not ill-appearing or diaphoretic.   HENT:      Head: Normocephalic and atraumatic.      Mouth/Throat:      Mouth: Mucous membranes are moist.      Pharynx: Oropharynx is clear.   Eyes:      Extraocular Movements: Extraocular movements intact.      Conjunctiva/sclera: Conjunctivae normal.      Pupils: Pupils are equal, round, and reactive to light.   Cardiovascular:      Rate and Rhythm: Normal rate and regular rhythm.      Pulses: Normal pulses.      Heart sounds: Normal heart sounds.   Pulmonary:      Effort: Pulmonary effort is normal. No respiratory distress.      Breath sounds: Normal breath sounds.   Musculoskeletal:      Right hand: Deformity (Related to surgery, no trauma) and tenderness present. No bony tenderness. Decreased range of motion.      Left hand: Tenderness present. No bony tenderness. Decreased range of motion. Normal pulse.        Hands:       Right foot: Prominent metatarsal heads present.        Feet:    Skin:     General: Skin is warm and dry.   Neurological:      General: No focal deficit present.      Mental Status: He is alert and oriented to person, place, and time.      Motor: No weakness.      Coordination: Coordination normal.   Psychiatric:         Mood and Affect: Mood normal.         Behavior: Behavior normal.         Thought Content: Thought content normal.         Judgment: Judgment normal.          Assessment and Plan     Assessment/Plan:  Diagnoses and all orders for this visit:    1. Bilateral hand pain  -     XR Hand 3+ View Bilateral; Future  -     Diclofenac Sodium (VOLTAREN) 1 % gel gel; Apply 4 g topically to the appropriate area as directed 4 (Four) Times a Day As Needed (joint pain).  Dispense: 100 g; Refill: 1  -     Ambulatory Referral to Orthopedic Surgery    2. Pain in both wrists  Assessment & Plan:  He has chronic pain likely with some related arthritis in his right wrist, but I believe that his left wrist is mostly tendinitis.  I  will do an x-ray to further evaluate, but I also recommend that he see Ortho.  I encouraged rest, support, and compression.  He has not previously tried anti-inflammatory gel, Voltaren, so I will prescribe a trial of that.  I agree that he should avoid oral anti-inflammatories is much as possible.    Orders:  -     XR Hand 3+ View Bilateral; Future  -     Diclofenac Sodium (VOLTAREN) 1 % gel gel; Apply 4 g topically to the appropriate area as directed 4 (Four) Times a Day As Needed (joint pain).  Dispense: 100 g; Refill: 1  -     Ambulatory Referral to Orthopedic Surgery    3. Primary hypertension  Assessment & Plan:  Hypertension is chronic and stable on current therapy.  He will continue medications as previously prescribed.      4. Mixed hyperlipidemia  Assessment & Plan:  Hyperlipidemia is chronic per patient history.  Level will be checked on labs, and they will continue current medication pending results.      5. Gastroesophageal reflux disease without esophagitis  Assessment & Plan:  His GERD is chronic and well-controlled on omeprazole.  I will continue current medication.      6. Anxiety and depression  Assessment & Plan:  Patient's depression and anxiety are currently controlled on medications.  We will continue current therapy.      7. Toe pain, right  Assessment & Plan:  He does have multiple toe deformities, and I advised that some of his discomfort would be alleviated by shoes with a wide toebox to avoid any crowding.  I recommend seeing a podiatrist for further evaluation.    Orders:  -     XR Foot 3+ View Right; Future        Follow Up  Return in about 6 months (around 11/28/2024) for Annual Physical.    Shahnaz Hawkins PA-C  [facility]

## 2024-05-29 NOTE — ASSESSMENT & PLAN NOTE
He has chronic pain likely with some related arthritis in his right wrist, but I believe that his left wrist is mostly tendinitis.  I will do an x-ray to further evaluate, but I also recommend that he see Ortho.  I encouraged rest, support, and compression.  He has not previously tried anti-inflammatory gel, Voltaren, so I will prescribe a trial of that.  I agree that he should avoid oral anti-inflammatories is much as possible.

## 2024-05-29 NOTE — ASSESSMENT & PLAN NOTE
Hyperlipidemia is chronic per patient history.  Level will be checked on labs, and they will continue current medication pending results.

## 2024-05-29 NOTE — ASSESSMENT & PLAN NOTE
He does have multiple toe deformities, and I advised that some of his discomfort would be alleviated by shoes with a wide toebox to avoid any crowding.  I recommend seeing a podiatrist for further evaluation.

## 2024-05-29 NOTE — ASSESSMENT & PLAN NOTE
Hypertension is chronic and stable on current therapy.  He will continue medications as previously prescribed.

## 2024-05-30 ENCOUNTER — TELEPHONE (OUTPATIENT)
Dept: FAMILY MEDICINE CLINIC | Facility: CLINIC | Age: 49
End: 2024-05-30

## 2024-05-30 NOTE — TELEPHONE ENCOUNTER
Caller: Dante Tan    Relationship: Self    Best call back number: [phone]    Caller requesting test results: YES    What test was performed: X-RAYS    When was the test performed: 05/28/2024    Where was the test performed: [facility]    Additional notes: PLEASE CALL TO DISCUSS RESULTS

## 2024-05-31 ENCOUNTER — TELEPHONE (OUTPATIENT)
Dept: FAMILY MEDICINE CLINIC | Facility: CLINIC | Age: 49
End: 2024-05-31
Payer: COMMERCIAL

## 2024-05-31 NOTE — TELEPHONE ENCOUNTER
Left Message: if Pt calls back ok for [facility] to relay--He has lots of wear-and-tear/arthritis changes in his hands as expected.

## 2024-05-31 NOTE — TELEPHONE ENCOUNTER
Name: Dante Tan    Relationship: Self    Best Callback Number: [phone]    [facility]UB PROVIDED THE RELAY MESSAGE FROM THE OFFICE   PATIENT VOICED UNDERSTANDING AND HAS NO FURTHER QUESTIONS AT THIS TIME    ADDITIONAL INFORMATION: PATIENT ASKED IF THE ARTHRITIS IS IN BOTH HANDS.

## 2024-06-11 DIAGNOSIS — E78.2 MIXED HYPERLIPIDEMIA: ICD-10-CM

## 2024-06-11 RX ORDER — ROSUVASTATIN CALCIUM 20 MG/1
20 TABLET, COATED ORAL DAILY
Qty: 90 TABLET | Refills: 0 | Status: SHIPPED | OUTPATIENT
Start: 2024-06-11

## 2024-06-22 DIAGNOSIS — I10 PRIMARY HYPERTENSION: ICD-10-CM

## 2024-06-24 RX ORDER — LOSARTAN POTASSIUM AND HYDROCHLOROTHIAZIDE 12.5; 5 MG/1; MG/1
1 TABLET ORAL DAILY
Qty: 90 TABLET | Refills: 0 | Status: SHIPPED | OUTPATIENT
Start: 2024-06-24

## 2024-06-25 ENCOUNTER — OFFICE VISIT (OUTPATIENT)
Dept: ORTHOPEDIC SURGERY | Facility: CLINIC | Age: 49
End: 2024-06-25
Payer: COMMERCIAL

## 2024-06-25 VITALS
DIASTOLIC BLOOD PRESSURE: 82 MMHG | WEIGHT: 267 LBS | HEIGHT: 72 IN | BODY MASS INDEX: 36.16 KG/M2 | SYSTOLIC BLOOD PRESSURE: 130 MMHG

## 2024-06-25 DIAGNOSIS — M25.531 PAIN IN BOTH WRISTS: Primary | ICD-10-CM

## 2024-06-25 DIAGNOSIS — M19.032 PRIMARY OSTEOARTHRITIS OF BOTH WRISTS: ICD-10-CM

## 2024-06-25 DIAGNOSIS — M18.0 ARTHRITIS OF CARPOMETACARPAL (CMC) JOINT OF BOTH THUMBS: ICD-10-CM

## 2024-06-25 DIAGNOSIS — M19.031 PRIMARY OSTEOARTHRITIS OF BOTH WRISTS: ICD-10-CM

## 2024-06-25 DIAGNOSIS — M25.532 PAIN IN BOTH WRISTS: Primary | ICD-10-CM

## 2024-06-25 PROCEDURE — 99204 OFFICE O/P NEW MOD 45 MIN: CPT | Performed by: STUDENT IN AN ORGANIZED HEALTH CARE EDUCATION/TRAINING PROGRAM

## 2024-06-25 PROCEDURE — 20604 DRAIN/INJ JOINT/BURSA W/US: CPT | Performed by: STUDENT IN AN ORGANIZED HEALTH CARE EDUCATION/TRAINING PROGRAM

## 2024-06-25 RX ORDER — TRIAMCINOLONE ACETONIDE 40 MG/ML
20 INJECTION, SUSPENSION INTRA-ARTICULAR; INTRAMUSCULAR
Status: COMPLETED | OUTPATIENT
Start: 2024-06-25 | End: 2024-06-25

## 2024-06-25 RX ORDER — LIDOCAINE HYDROCHLORIDE 10 MG/ML
0.5 INJECTION, SOLUTION EPIDURAL; INFILTRATION; INTRACAUDAL; PERINEURAL
Status: COMPLETED | OUTPATIENT
Start: 2024-06-25 | End: 2024-06-25

## 2024-06-25 RX ADMIN — TRIAMCINOLONE ACETONIDE 20 MG: 40 INJECTION, SUSPENSION INTRA-ARTICULAR; INTRAMUSCULAR at 11:29

## 2024-06-25 RX ADMIN — LIDOCAINE HYDROCHLORIDE 0.5 ML: 10 INJECTION, SOLUTION EPIDURAL; INFILTRATION; INTRACAUDAL; PERINEURAL at 11:29

## 2024-06-25 NOTE — PROGRESS NOTES
Procedure   - Small Joint Arthrocentesis: L thumb CMC on 6/25/2024 11:29 AM  Indications: pain  Details: 27 G (Short ) needle, ultrasound-guided radial approach  Medications: 0.5 mL lidocaine PF 1% 1 %; 20 mg triamcinolone acetonide 40 MG/ML  Outcome: tolerated well, no immediate complications  Procedure, treatment alternatives, risks and benefits explained, specific risks discussed. Consent was given by the patient. Immediately prior to procedure a time out was called to verify the correct patient, procedure, equipment, support staff and site/side marked as required. Patient was prepped and draped in the usual sterile fashion.

## 2024-06-25 NOTE — PROGRESS NOTES
[facility] Office Visit     Office Visit       Date: 06/25/2024   Patient Name: Dante Tan  MRN: [id number]  YOB: 1975    Referring Physician: Shahnaz Hawkins*     Chief Complaint:   Chief Complaint   Patient presents with   • Right Hand - Pain   • Left Hand - Pain       History of Present Illness:   Dante Tan is a 48 y.o. male who presents with new problem of: bilateral hand pain.  Onset: atraumatic and gradual in nature. The issue has been ongoing for 1 year(s). Pain is a 3/10 on the pain scale. Pain is described as aching, burning, and shooting. Associated symptoms include pain, popping, grinding, and giving way/buckling. The pain is worse with sleeping, working, leisure, and any movement of the joint; sitting, ice, and heat improve the pain. Previous treatments have included: bracing and NSAIDS.    Subjective   Review of Systems: Review of Systems   Constitutional:  Negative for chills, fever, unexpected weight gain and unexpected weight loss.   HENT:  Negative for congestion, postnasal drip and rhinorrhea.    Eyes:  Negative for blurred vision.   Respiratory:  Negative for shortness of breath.    Cardiovascular:  Negative for leg swelling.   Gastrointestinal:  Negative for abdominal pain, nausea and vomiting.   Genitourinary:  Negative for difficulty urinating.   Musculoskeletal:  Positive for arthralgias. Negative for gait problem, joint swelling and myalgias.   Skin:  Negative for skin lesions and wound.   Neurological:  Negative for dizziness, weakness, light-headedness and numbness.   Hematological:  Does not bruise/bleed easily.   Psychiatric/Behavioral:  Negative for depressed mood.         I have reviewed the following portions of the patient's history:History of Present Illness and review of systems.    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   • Visual impairment        Past Surgical History:   Past Surgical History:   Procedure Laterality Date   • CHOLECYSTECTOMY         Family History:   Family History   Problem Relation Age of Onset   • Arthritis Father    • Cancer Father        Social History:   Social History     Socioeconomic History   • Marital status:    Tobacco Use   • Smoking status: Former     Current packs/day: 0.00     Average packs/day: 0.3 packs/day for 2.0 years (0.5 ttl pk-yrs)     Types: Cigarettes     Start date:      Quit date:      Years since quittin.4   • Smokeless tobacco: Never   Vaping Use   • Vaping status: Never Used   Substance and Sexual Activity   • Alcohol use: Not Currently     Comment: Recovering   • Drug use: Yes     Types: Marijuana   • Sexual activity: Not Currently     Partners: Female     Birth control/protection: None, Birth control pill     Comment: Spouse takes b/c pill       Medications:   Current Outpatient Medications:   •  Diclofenac Sodium (VOLTAREN) 1 % gel gel, Apply 4 g topically to the appropriate area as directed 4 (Four) Times a Day As Needed (joint pain)., Disp: 100 g, Rfl: 1  •  FLUoxetine (PROzac) 20 MG capsule, TAKE 3 CAPSULES BY MOUTH ONCE DAILY IN THE MORNING FOR  MOOD/DEPRESSION, Disp: 240 capsule, Rfl: 0  •  losartan-hydrochlorothiazide (HYZAAR) 50-12.5 MG per tablet, Take 1 tablet by mouth once daily, Disp: 90 tablet, Rfl: 0  •  omeprazole (priLOSEC) 40 MG capsule, Take 1 capsule by mouth Daily. before a meal, Disp: 90 capsule, Rfl: 1  •  rosuvastatin (CRESTOR) 20 MG tablet, Take 1 tablet by mouth once daily, Disp: 90 tablet, Rfl: 0    Allergies:   Allergies   Allergen Reactions   • Celebrex [Celecoxib] Unknown - Low Severity   • Cymbalta [Duloxetine Hcl] Unknown - Low Severity   • Lisinopril Unknown - Low Severity       I reviewed the patient's chief  "complaint, history of present illness, review of systems, past medical history, surgical history, family history, social history, medications and allergy list.     Objective    Vital Signs:   Vitals:    06/25/24 1048   BP: 130/82   Weight: 121 kg (267 lb)   Height: 182.9 cm (72\")     Body mass index is 36.21 kg/m².   Class 2 Severe Obesity (BMI >=35 and <=39.9). Obesity-related health conditions include the following: hypertension, coronary heart disease, diabetes mellitus, and dyslipidemias. Obesity is newly identified. BMI is is above average; BMI management plan is completed. We discussed portion control and increasing exercise.      Patient reports that he is a former smoker. He quit smoking in 2004.  He has not resumed smoking since that time.  This behavior was applauded and she was encouraged to continue in smoking cessation.  We will continue to monitor at subsequent visits.    Ortho Exam:  Hands and Digits: Inspection Right: no deformities, atrophy, swelling, warmth, mass, or erythema and normal attitude. Inspection Left: no deformities, warmth, mass, or erythema; swelling and thenar atrophy; and normal attitude. Soft Tissue Palpation Right: no tenderness of the soft tissue. Soft Tissue Palpation Left: tenderness of the dorsal aspect and the palmar aspect. Thumb Right: normal A1 pulley, passive range of motion, and active range of motion; no tenderness of the first metacarpal or the thumb; and no subluxation of the CMC joint or pain with CMC grind test. Thumb Left: normal A1 pulley, passive range of motion ( But painful), and active range of motion (but painful); tenderness of the CMC joint and pain with the CMC grind test; and no subluxation of the CMC joint. Index Finger Right: normal A1 pulley, active range of motion, and passive range of motion and no tenderness of the second metacarpal or the index finger. Index Finger Left: normal A1 pulley, passive range of motion, and active range of motion and no " tenderness of the second metacarpal or the index finger. Middle Finger Right: normal A1 pulley, active range of motion, and passive range of motion and no tenderness at the third metacarpal or of the middle finger. Middle Finger Left: normal A1 pulley, active range of motion, and passive range of motion and no tenderness of the third metacarpal or the middle finger. Ring Finger Right: normal A1 pulley, active range of motion, and passive range of motion and no tenderness of the fourth metacarpal or the ring finger. Ring Finger Left: normal A1 pulley, active range of motion, and passive range of motion and no tenderness of the fourth metacarpal or the ring finger. Little Finger Right: normal A1 pulley, active range of motion, and passive range of motion and no tenderness of the fifth metacarpal or the little finger. Little Finger Left: normal A1 pulley, active range of motion, and passive range of motion and no tenderness of the fifth metacarpal or the little finger. Stability Right: no general instability. Stability Left: no general instability. Strength Right: thumb strength 5/5,  5/5, and interossei 5/5. Strength Left: thumb strength 5/5, interossei 5/5, and  4/5.   Neurological System: Sensation on the Right: normal ulnar nerve distribution, radial nerve distribution, and median nerve distribution. Sensation on the Left: normal ulnar nerve distribution, radial nerve distribution, and median nerve distribution. Special Tests on the Left: carpal compression test negative and Finkelstein's test negative.   Skin: Right Upper Extremity: normal. Left Upper Extremity: normal.    Results Review:   Imaging Results (Last 24 Hours)       ** No results found for the last 24 hours. **        I personally reviewed and interpreted radiographs of the bilateral hands from 5/28/2024.  No acute fracture or dislocation.  Degenerative changes noted in both CMC joints worse on the left compared to right.  There also  degenerative changes in the interphalangeal joints of both hands.  Advanced wrist osteoarthritis noted in the radiocarpal joints.    Procedures    Assessment / Plan    Assessment/Plan:   Diagnoses and all orders for this visit:    1. Pain in both wrists (Primary)  -     Cancel: XR Wrist 3+ View Bilateral    2. Arthritis of carpometacarpal (CMC) joint of both thumbs    3. Primary osteoarthritis of both wrists    Other orders  -     Cancel: - Hand/Upper Extremity Injection  -     - Small Joint Arthrocentesis: L thumb CMC    Bilateral hand and wrist pain ongoing for the last year.  Symptoms worse on the left compared to right.  He had previous right radius fracture with ligament injury that required surgical fixation.  He has developed degenerative changes in both the wrist and CMC joint of both hands and wrists.  This is limiting his ability to , lift, and thus work.  He has been taking Tylenol and Voltaren gel but without significant relief of symptoms.  On physical exam today, he has swelling and pain particular to the CMC joint of the left hand.  I discussed treatment options with him including continue the Voltaren gel and Tylenol.  He can do these 3-4 times per day.  We will fit him today for a [employer] Cool hand-based CMC joint stabilizing brace on the left.  I discussed the risks and benefits of an ultrasound-guided corticosteroid injection and he would like to proceed.  Injection was carried out into the left CMC joint.  He will follow-up as his symptoms dictate.    Previous imaging studies reviewed: 5/28/2024-radiographs of bilateral hands.    Previous documentation reviewed: 5/28/2024-office visit-GARO Maldonado.    Previous laboratory results reviewed: 12/18/2023-creatinine 1.00, EGFR 92.    Follow Up:   No follow-ups on file.      Omer Alegre MD  [facility]

## 2024-07-15 DIAGNOSIS — F41.9 ANXIETY AND DEPRESSION: ICD-10-CM

## 2024-07-15 DIAGNOSIS — F32.A ANXIETY AND DEPRESSION: ICD-10-CM

## 2024-07-15 RX ORDER — FLUOXETINE HYDROCHLORIDE 20 MG/1
CAPSULE ORAL
Qty: 240 CAPSULE | Refills: 0 | Status: SHIPPED | OUTPATIENT
Start: 2024-07-15

## 2024-09-12 DIAGNOSIS — I10 PRIMARY HYPERTENSION: ICD-10-CM

## 2024-09-12 RX ORDER — LOSARTAN POTASSIUM AND HYDROCHLOROTHIAZIDE 12.5; 5 MG/1; MG/1
1 TABLET ORAL DAILY
Qty: 90 TABLET | Refills: 0 | Status: SHIPPED | OUTPATIENT
Start: 2024-09-12

## 2024-10-08 DIAGNOSIS — E78.2 MIXED HYPERLIPIDEMIA: ICD-10-CM

## 2024-10-08 RX ORDER — ROSUVASTATIN CALCIUM 20 MG/1
20 TABLET, COATED ORAL DAILY
Qty: 90 TABLET | Refills: 0 | Status: SHIPPED | OUTPATIENT
Start: 2024-10-08

## 2024-10-13 DIAGNOSIS — F32.A ANXIETY AND DEPRESSION: ICD-10-CM

## 2024-10-13 DIAGNOSIS — F41.9 ANXIETY AND DEPRESSION: ICD-10-CM

## 2024-12-02 ENCOUNTER — OFFICE VISIT (OUTPATIENT)
Dept: FAMILY MEDICINE CLINIC | Facility: CLINIC | Age: 49
End: 2024-12-02
Payer: COMMERCIAL

## 2024-12-02 VITALS
DIASTOLIC BLOOD PRESSURE: 84 MMHG | OXYGEN SATURATION: 96 % | RESPIRATION RATE: 14 BRPM | SYSTOLIC BLOOD PRESSURE: 122 MMHG | HEART RATE: 68 BPM | WEIGHT: 281 LBS | HEIGHT: 72 IN | BODY MASS INDEX: 38.06 KG/M2

## 2024-12-02 DIAGNOSIS — F32.A ANXIETY AND DEPRESSION: ICD-10-CM

## 2024-12-02 DIAGNOSIS — Z00.00 GENERAL MEDICAL EXAM: Primary | ICD-10-CM

## 2024-12-02 DIAGNOSIS — M25.50 POLYARTHRALGIA: ICD-10-CM

## 2024-12-02 DIAGNOSIS — F41.9 ANXIETY AND DEPRESSION: ICD-10-CM

## 2024-12-02 DIAGNOSIS — Z13.1 SCREENING FOR DIABETES MELLITUS: ICD-10-CM

## 2024-12-02 DIAGNOSIS — Z13.29 SCREENING FOR THYROID DISORDER: ICD-10-CM

## 2024-12-02 DIAGNOSIS — G44.86 CERVICOGENIC HEADACHE: ICD-10-CM

## 2024-12-02 DIAGNOSIS — I10 PRIMARY HYPERTENSION: ICD-10-CM

## 2024-12-02 DIAGNOSIS — E78.2 MIXED HYPERLIPIDEMIA: ICD-10-CM

## 2024-12-02 DIAGNOSIS — K21.9 GASTROESOPHAGEAL REFLUX DISEASE WITHOUT ESOPHAGITIS: ICD-10-CM

## 2024-12-02 PROCEDURE — 99396 PREV VISIT EST AGE 40-64: CPT | Performed by: PHYSICIAN ASSISTANT

## 2024-12-02 RX ORDER — OMEPRAZOLE 40 MG/1
40 CAPSULE, DELAYED RELEASE ORAL DAILY
Qty: 90 CAPSULE | Refills: 1 | Status: SHIPPED | OUTPATIENT
Start: 2024-12-02

## 2024-12-02 RX ORDER — ROSUVASTATIN CALCIUM 20 MG/1
20 TABLET, COATED ORAL DAILY
Qty: 90 TABLET | Refills: 0 | Status: SHIPPED | OUTPATIENT
Start: 2024-12-02

## 2024-12-02 RX ORDER — LOSARTAN POTASSIUM AND HYDROCHLOROTHIAZIDE 12.5; 5 MG/1; MG/1
1 TABLET ORAL DAILY
Qty: 90 TABLET | Refills: 0 | Status: SHIPPED | OUTPATIENT
Start: 2024-12-02

## 2024-12-02 NOTE — ASSESSMENT & PLAN NOTE
He has numerous joint pains, as well order an arthritis panel, especially due to his father's joint deformity related to arthritis.

## 2024-12-02 NOTE — PROGRESS NOTES
Male Physical Note      Date: 2024   Patient Name: Dante Tan  : 1975   MRN: [id number]     Chief Complaint:    Chief Complaint   Patient presents with    Annual Exam     Patient or patient representative verbalized consent for the use of Ambient Listening during the visit with  Shahnaz Hawkins PA-C for chart documentation. 2024 08:19 EDT      History of Present Illness: Dante Tan is a 49 y.o. male who is here today for their annual health maintenance and physical.       He experiences headaches when looking down, which he attributes to neck arthritis. Despite chiropractic treatment, the relief is temporary, lasting only 2 to 3 hours. The headaches originate from the back of his head and occasionally cause eye pain. Over-the-counter medications such as Tylenol and Excedrin have been ineffective.    He has regular eye exams, with the next one scheduled for 2025. He has a condition in his right eye that causes straight lines to appear squiggly, for which he takes a multivitamin.    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 He has not had recent x-rays, but past ones revealed a degenerative disc in his neck and a bone spur on the front of his vertebrae. He reports no radiating pain into his arms. He occasionally experiences a sensation like a jolt of lightning hitting his ear and descending to his jawbone.    He does not engage in formal exercise and has difficulty losing weight.     He admits that his diet could be improved.    FAMILY HISTORY  His father had severe degenerative arthritis.      Subjective      Review of Systems:   Review of Systems   Constitutional:  Negative for activity change, appetite change, fatigue, fever, unexpected weight gain and unexpected weight loss.   HENT:  Negative for congestion, ear discharge, ear pain, postnasal drip, rhinorrhea, sinus pressure, sneezing, sore throat, trouble swallowing and  voice change.    Eyes:  Negative for blurred vision, double vision, photophobia, pain, itching and visual disturbance.   Respiratory:  Negative for apnea, cough, chest tightness, shortness of breath and wheezing.    Cardiovascular:  Negative for chest pain, palpitations and leg swelling.   Gastrointestinal:  Negative for abdominal pain, blood in stool, constipation, diarrhea, nausea, vomiting and GERD.   Endocrine: Negative for cold intolerance, heat intolerance, polydipsia and polyuria.   Genitourinary:  Negative for decreased urine volume, difficulty urinating, dysuria, flank pain, frequency, hematuria and urinary incontinence.   Musculoskeletal:  Positive for arthralgias and neck pain. Negative for back pain, gait problem, joint swelling and myalgias.   Skin:  Negative for rash, skin lesions and wound.   Allergic/Immunologic: Negative for environmental allergies and food allergies.   Neurological:  Negative for dizziness, syncope, weakness, light-headedness, numbness and headache.   Hematological:  Negative for adenopathy. Does not bruise/bleed easily.   Psychiatric/Behavioral:  Negative for decreased concentration, dysphoric mood, sleep disturbance, depressed mood and stress. The patient is not nervous/anxious.        Past Medical History, Social History, Family History and Care Team were all reviewed with patient and updated as appropriate.     Medications:     Current Outpatient Medications:     Diclofenac Sodium (VOLTAREN) 1 % gel gel, Apply 4 g topically to the appropriate area as directed 4 (Four) Times a Day As Needed (joint pain)., Disp: 100 g, Rfl: 1    FLUoxetine (PROzac) 20 MG capsule, TAKE 3 CAPSULES BY MOUTH ONCE DAILY IN THE MORNING, Disp: 240 capsule, Rfl: 0    losartan-hydrochlorothiazide (HYZAAR) 50-12.5 MG per tablet, Take 1 tablet by mouth Daily., Disp: 90 tablet, Rfl: 0    omeprazole (priLOSEC) 40 MG capsule, Take 1 capsule by mouth Daily. before a meal, Disp: 90 capsule, Rfl: 1     "rosuvastatin (CRESTOR) 20 MG tablet, Take 1 tablet by mouth Daily., Disp: 90 tablet, Rfl: 0    Allergies:   Allergies   Allergen Reactions    Celebrex [Celecoxib] Unknown - Low Severity    Cymbalta [Duloxetine Hcl] Unknown - Low Severity    Lisinopril Unknown - Low Severity       Health Maintenance   Topic Date Due    ANNUAL PHYSICAL  11/27/2024    LIPID PANEL  12/18/2024    TDAP/TD VACCINES (1 - Tdap) 01/01/2025 (Originally 11/30/1992)    COVID-19 Vaccine (1 - 2024-25 season) 02/21/2025 (Originally 9/1/2024)    INFLUENZA VACCINE  03/31/2025 (Originally 7/1/2024)    ZOSTER VACCINE (1 of 2) 12/02/2025 (Originally 11/30/2023)    BMI FOLLOWUP  06/25/2025    COLORECTAL CANCER SCREENING  12/10/2026    HEPATITIS C SCREENING  Completed    Pneumococcal Vaccine 0-64  Aged Out      Objective     Physical Exam:  Vital Signs:   Vitals:    12/02/24 0806 12/02/24 0838   BP: 138/86 122/84   BP Location: Left arm Left arm   Patient Position: Sitting Sitting   Cuff Size: Adult    Pulse: 68    Resp: 14    SpO2: 96%    Weight: 127 kg (281 lb)    Height: 182.9 cm (72\")    PainSc:   6    PainLoc: Head      Body mass index is 38.11 kg/m².     Physical Exam  Vitals and nursing note reviewed.   Constitutional:       General: He is not in acute distress.     Appearance: Normal appearance. He is obese. He is not ill-appearing or diaphoretic.   HENT:      Head: Normocephalic and atraumatic.      Mouth/Throat:      Mouth: Mucous membranes are moist.      Pharynx: Oropharynx is clear.   Eyes:      Extraocular Movements: Extraocular movements intact.      Conjunctiva/sclera: Conjunctivae normal.      Pupils: Pupils are equal, round, and reactive to light.   Cardiovascular:      Rate and Rhythm: Normal rate and regular rhythm.      Pulses: Normal pulses.      Heart sounds: Normal heart sounds.   Pulmonary:      Effort: Pulmonary effort is normal. No respiratory distress.      Breath sounds: Normal breath sounds.   Skin:     General: Skin is warm " and dry.   Neurological:      General: No focal deficit present.      Mental Status: He is alert and oriented to person, place, and time.      Motor: No weakness.      Coordination: Coordination normal.   Psychiatric:         Mood and Affect: Mood normal.         Behavior: Behavior normal.         Thought Content: Thought content normal.         Judgment: Judgment normal.         Assessment / Plan        Assessment/Plan:   Diagnoses and all orders for this visit:    1. General medical exam (Primary)  Comments:  Benefits of immunizations and preventative screenings discussed with the patient and encouraged.  Orders:  -     Thyroid Cascade Profile; Future  -     Lipid Panel; Future  -     Hemoglobin A1c; Future  -     Comprehensive Metabolic Panel; Future  -     CBC & Differential  -     Comprehensive Metabolic Panel  -     Hemoglobin A1c  -     Lipid Panel  -     Thyroid Cascade Profile    2. Cervicogenic headache  Assessment & Plan:  The patient experiences headaches that start from the neck and shoot through the back of his head, sometimes causing eye pain. He has tried Tylenol and Excedrin without relief. The headaches are likely related to his neck arthritis and degenerative disc disease. An x-ray of the neck has been ordered to further investigate the cause of the headaches.    Orders:  -     XR Spine Cervical 2 or 3 View; Future    3. Polyarthralgia  Assessment & Plan:  He has numerous joint pains, as well order an arthritis panel, especially due to his father's joint deformity related to arthritis.    Orders:  -     HARMONY Comprehensive Plus Profile  -     C-reactive Protein; Future  -     Rheumatoid Factor; Future  -     Sedimentation Rate; Future  -     Sedimentation Rate  -     Rheumatoid Factor  -     C-reactive Protein    4. Mixed hyperlipidemia  Assessment & Plan:  Hyperlipidemia is chronic per patient history.  Level will be checked on labs, and they will continue current medication pending  results.    Orders:  -     Lipid Panel; Future  -     Comprehensive Metabolic Panel; Future  -     CBC & Differential  -     rosuvastatin (CRESTOR) 20 MG tablet; Take 1 tablet by mouth Daily.  Dispense: 90 tablet; Refill: 0  -     Comprehensive Metabolic Panel  -     Lipid Panel    5. Anxiety and depression  -     FLUoxetine (PROzac) 20 MG capsule; TAKE 3 CAPSULES BY MOUTH ONCE DAILY IN THE MORNING  Dispense: 240 capsule; Refill: 0    6. Gastroesophageal reflux disease without esophagitis  Assessment & Plan:  His GERD is chronic and well-controlled on omeprazole.  I will continue current medication.    Orders:  -     omeprazole (priLOSEC) 40 MG capsule; Take 1 capsule by mouth Daily. before a meal  Dispense: 90 capsule; Refill: 1    7. Primary hypertension  Assessment & Plan:  Hypertension is chronic and stable on current therapy.  He will continue medications as previously prescribed.    Orders:  -     Comprehensive Metabolic Panel; Future  -     CBC & Differential  -     losartan-hydrochlorothiazide (HYZAAR) 50-12.5 MG per tablet; Take 1 tablet by mouth Daily.  Dispense: 90 tablet; Refill: 0  -     Comprehensive Metabolic Panel    8. Screening for diabetes mellitus  -     Hemoglobin A1c; Future  -     CBC & Differential  -     Hemoglobin A1c    9. Screening for thyroid disorder  -     Thyroid Cascade Profile; Future  -     CBC & Differential  -     Thyroid Cascade Profile        Follow Up:   Return in about 3 months (around 3/2/2025) for next scheduled follow up on neck pain and HA.  Discussed injury prevention, diet and exercise, safe sexual practices, and screening for common diseases. Encouraged use of sunscreen and seatbelts. Discussed timing of colon cancer cancer screening, prostate cancer screening, and review of skin for lesions. Avoidance of tobacco encouraged. Limitation or avoidance of alcohol encouraged. Recommend yearly dental and eye exams. Also discussed monitoring of blood pressure and lipids.      Dante Tan voices understanding and acceptance of this advice and will call back with any further questions or concerns. AVS with preventive healthcare tips printed for patient.     Shahnaz Hawkins PA-C  [facility] Internal Medicine [facility]

## 2024-12-02 NOTE — ASSESSMENT & PLAN NOTE
The patient experiences headaches that start from the neck and shoot through the back of his head, sometimes causing eye pain. He has tried Tylenol and Excedrin without relief. The headaches are likely related to his neck arthritis and degenerative disc disease. An x-ray of the neck has been ordered to further investigate the cause of the headaches.

## 2024-12-03 LAB
ALBUMIN SERPL-MCNC: 4.3 G/DL (ref 3.8–4.9)
ALP SERPL-CCNC: 55 IU/L (ref 44–121)
ALT SERPL-CCNC: 21 IU/L (ref 0–44)
AST SERPL-CCNC: 27 IU/L (ref 0–40)
BASOPHILS # BLD AUTO: 0 X10E3/UL (ref 0–0.2)
BASOPHILS NFR BLD AUTO: 1 %
BILIRUB SERPL-MCNC: 0.4 MG/DL (ref 0–1.2)
BUN SERPL-MCNC: 21 MG/DL (ref 6–24)
BUN/CREAT SERPL: 19 (ref 9–20)
CALCIUM SERPL-MCNC: 9.5 MG/DL (ref 8.7–10.2)
CENTROMERE B AB SER-ACNC: <0.2 AI (ref 0–0.9)
CHLORIDE SERPL-SCNC: 101 MMOL/L (ref 96–106)
CHOLEST SERPL-MCNC: 184 MG/DL (ref 100–199)
CHROMATIN AB SERPL-ACNC: <0.2 AI (ref 0–0.9)
CO2 SERPL-SCNC: 24 MMOL/L (ref 20–29)
CREAT SERPL-MCNC: 1.13 MG/DL (ref 0.76–1.27)
CRP SERPL-MCNC: <1 MG/L (ref 0–10)
DSDNA AB SER-ACNC: <1 IU/ML (ref 0–9)
EGFRCR SERPLBLD CKD-EPI 2021: 79 ML/MIN/1.73
ENA JO1 AB SER-ACNC: <0.2 AI (ref 0–0.9)
ENA RNP AB SER-ACNC: 5.3 AI (ref 0–0.9)
ENA SCL70 AB SER-ACNC: <0.2 AI (ref 0–0.9)
ENA SM AB SER-ACNC: <0.2 AI (ref 0–0.9)
ENA SM+RNP AB SER-ACNC: <0.2 AI (ref 0–0.9)
ENA SS-A AB SER-ACNC: <0.2 AI (ref 0–0.9)
ENA SS-B AB SER-ACNC: <0.2 AI (ref 0–0.9)
EOSINOPHIL # BLD AUTO: 0.2 X10E3/UL (ref 0–0.4)
EOSINOPHIL NFR BLD AUTO: 4 %
ERYTHROCYTE [DISTWIDTH] IN BLOOD BY AUTOMATED COUNT: 12.6 % (ref 11.6–15.4)
ERYTHROCYTE [SEDIMENTATION RATE] IN BLOOD BY WESTERGREN METHOD: 6 MM/HR (ref 0–30)
GLOBULIN SER CALC-MCNC: 2.8 G/DL (ref 1.5–4.5)
GLUCOSE SERPL-MCNC: 103 MG/DL (ref 70–99)
HBA1C MFR BLD: 5.9 % (ref 4.8–5.6)
HCT VFR BLD AUTO: 40.8 % (ref 37.5–51)
HDLC SERPL-MCNC: 56 MG/DL
HGB BLD-MCNC: 13.4 G/DL (ref 13–17.7)
IMM GRANULOCYTES # BLD AUTO: 0 X10E3/UL (ref 0–0.1)
IMM GRANULOCYTES NFR BLD AUTO: 0 %
LDLC SERPL CALC-MCNC: 116 MG/DL (ref 0–99)
LYMPHOCYTES # BLD AUTO: 1.3 X10E3/UL (ref 0.7–3.1)
LYMPHOCYTES NFR BLD AUTO: 27 %
Lab: ABNORMAL
MCH RBC QN AUTO: 30.8 PG (ref 26.6–33)
MCHC RBC AUTO-ENTMCNC: 32.8 G/DL (ref 31.5–35.7)
MCV RBC AUTO: 94 FL (ref 79–97)
MONOCYTES # BLD AUTO: 0.6 X10E3/UL (ref 0.1–0.9)
MONOCYTES NFR BLD AUTO: 12 %
NEUTROPHILS # BLD AUTO: 2.8 X10E3/UL (ref 1.4–7)
NEUTROPHILS NFR BLD AUTO: 56 %
PLATELET # BLD AUTO: 248 X10E3/UL (ref 150–450)
POTASSIUM SERPL-SCNC: 4.6 MMOL/L (ref 3.5–5.2)
PROT SERPL-MCNC: 7.1 G/DL (ref 6–8.5)
RBC # BLD AUTO: 4.35 X10E6/UL (ref 4.14–5.8)
RHEUMATOID FACT SERPL-ACNC: 10.3 IU/ML
RIBOSOMAL P AB SER-ACNC: <0.2 AI (ref 0–0.9)
SODIUM SERPL-SCNC: 137 MMOL/L (ref 134–144)
TRIGL SERPL-MCNC: 66 MG/DL (ref 0–149)
TSH SERPL DL<=0.005 MIU/L-ACNC: 1.52 UIU/ML (ref 0.45–4.5)
VLDLC SERPL CALC-MCNC: 12 MG/DL (ref 5–40)
WBC # BLD AUTO: 5 X10E3/UL (ref 3.4–10.8)

## 2024-12-05 ENCOUNTER — TELEPHONE (OUTPATIENT)
Dept: FAMILY MEDICINE CLINIC | Facility: CLINIC | Age: 49
End: 2024-12-05
Payer: COMMERCIAL

## 2024-12-05 NOTE — TELEPHONE ENCOUNTER
Left Message: If Pt calls back ok for Hub to relay--His x-ray does show significant wear-and-tear changes and bone spurs.  We can schedule with interventional pain management if he would like.Pt has viewed message on My Chart

## 2024-12-08 DIAGNOSIS — M54.2 NECK PAIN: ICD-10-CM

## 2024-12-08 DIAGNOSIS — G44.86 CERVICOGENIC HEADACHE: Primary | ICD-10-CM

## 2025-03-03 ENCOUNTER — OFFICE VISIT (OUTPATIENT)
Dept: FAMILY MEDICINE CLINIC | Facility: CLINIC | Age: 50
End: 2025-03-03
Payer: COMMERCIAL

## 2025-03-03 VITALS
WEIGHT: 290.7 LBS | BODY MASS INDEX: 39.37 KG/M2 | SYSTOLIC BLOOD PRESSURE: 132 MMHG | HEART RATE: 70 BPM | DIASTOLIC BLOOD PRESSURE: 82 MMHG | OXYGEN SATURATION: 99 % | HEIGHT: 72 IN

## 2025-03-03 DIAGNOSIS — M25.50 POLYARTHRALGIA: ICD-10-CM

## 2025-03-03 DIAGNOSIS — G44.86 CERVICOGENIC HEADACHE: Primary | ICD-10-CM

## 2025-03-03 DIAGNOSIS — R76.8 POSITIVE ANA (ANTINUCLEAR ANTIBODY): ICD-10-CM

## 2025-03-03 DIAGNOSIS — R73.03 PREDIABETES: ICD-10-CM

## 2025-03-03 PROCEDURE — 99214 OFFICE O/P EST MOD 30 MIN: CPT | Performed by: PHYSICIAN ASSISTANT

## 2025-03-03 NOTE — PROGRESS NOTES
Office Note     Name: Dante Tan    : 1975     MRN: [id number]     Chief Complaint  Pain (Pain in neck, headaches follow up)    Subjective   Patient or patient representative verbalized consent for the use of Ambient Listening during the visit with  Shahnaz Hawkins PA-C for chart documentation. 3/3/2025  10:13 SPENCER Tan is a 49 y.o. male.     The patient is a 48-year-old male presenting with neck pain, headaches, and joint pain.    He reports neck and arm pain exacerbated by looking up or down, mitigated by maintaining a straight posture. Previous chiropractic care and physical therapy provided no significant relief. He experiences intermittent arm and shoulder pain, severe enough to disrupt sleep, and wrist pain.    He has headaches, possibly related to arthritis, accompanied by eye pain but no vision changes. New glasses have not alleviated the headaches. No dizziness or nausea. Headaches start at the skull-neck junction and progress to pain behind the eyes, relieved by wearing a sock cap and resting with eyes closed.    He has gained weight due to winter inactivity and has been unemployed for a month due to weather and body aches. Current medications are effective; no refills needed for omeprazole or rosuvastatin. Recently refilled fluoxetine.    He experiences right hip joint pain described as a burning sensation, with similar symptoms in the left hip for 2 years, preventing him from lying on his left side. Pain wakes him at night but has not been a recent issue.    Review of Systems:   Review of Systems   Constitutional:  Negative for chills, diaphoresis, fatigue and fever.   HENT:  Negative for congestion, sore throat and swollen glands.    Eyes:  Negative for visual disturbance.   Respiratory:  Negative for cough.    Cardiovascular:  Negative for chest pain.   Gastrointestinal:  Negative for abdominal pain, nausea and vomiting.   Genitourinary:  Negative for dysuria.    Musculoskeletal:  Positive for neck pain. Negative for myalgias.   Skin:  Negative for rash.   Neurological:  Positive for headache. Negative for dizziness, weakness, light-headedness and numbness.       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    Visual impairment        Past Surgical History:   Past Surgical History:   Procedure Laterality Date    CHOLECYSTECTOMY         Family History:   Family History   Problem Relation Age of Onset    Arthritis Father     Cancer Father     Vision loss Mother         Cataracts/Glaucoma       Social History:   Social History     Socioeconomic History    Marital status:    Tobacco Use    Smoking status: Former     Current packs/day: 0.00     Average packs/day: 0.3 packs/day for 3.0 years (1.0 ttl pk-yrs)     Types: Cigarettes     Start date: 2002     Quit date:      Years since quittin.1    Smokeless tobacco: Never   Vaping Use    Vaping status: Never Used   Substance and Sexual Activity    Alcohol use: Not Currently     Comment: Recovering    Drug use: Yes     Frequency: 7.0 times per week     Types: Marijuana     Comment: Use for pain in my neck,back,headaches and to help me sleep.    Sexual activity: Not Currently     Partners: Female     Birth control/protection: None, Birth control pill     Comment: Spouse takes b/c pill       Immunizations:   There is no immunization history on file for this patient.     Medications:     Current Outpatient Medications:     Diclofenac Sodium (VOLTAREN) 1 % gel gel, Apply 4 g topically to the appropriate area as directed 4 (Four) Times a Day As Needed (joint pain)., Disp: 100 g, Rfl: 1    FLUoxetine (PROzac) 20 MG capsule, TAKE 3 CAPSULES BY MOUTH ONCE DAILY IN THE MORNING, Disp: 240 capsule, Rfl: 0     "losartan-hydrochlorothiazide (HYZAAR) 50-12.5 MG per tablet, Take 1 tablet by mouth Daily., Disp: 90 tablet, Rfl: 0    omeprazole (priLOSEC) 40 MG capsule, Take 1 capsule by mouth Daily. before a meal, Disp: 90 capsule, Rfl: 1    rosuvastatin (CRESTOR) 20 MG tablet, Take 1 tablet by mouth Daily., Disp: 90 tablet, Rfl: 0    Allergies:   Allergies   Allergen Reactions    Celebrex [Celecoxib] Unknown - Low Severity    Cymbalta [Duloxetine Hcl] Unknown - Low Severity    Lisinopril Unknown - Low Severity       Objective     Vital Signs  /82   Pulse 70   Ht 182.9 cm (72\")   Wt 132 kg (290 lb 11.2 oz)   SpO2 99%   BMI 39.43 kg/m²   Estimated body mass index is 39.43 kg/m² as calculated from the following:    Height as of this encounter: 182.9 cm (72\").    Weight as of this encounter: 132 kg (290 lb 11.2 oz).         Physical Exam  Vitals and nursing note reviewed.   Constitutional:       General: He is not in acute distress.     Appearance: Normal appearance. He is obese. He is not ill-appearing or diaphoretic.   HENT:      Head: Normocephalic and atraumatic.      Mouth/Throat:      Mouth: Mucous membranes are moist.      Pharynx: Oropharynx is clear.   Eyes:      Extraocular Movements: Extraocular movements intact.      Conjunctiva/sclera: Conjunctivae normal.      Pupils: Pupils are equal, round, and reactive to light.   Cardiovascular:      Rate and Rhythm: Normal rate and regular rhythm.      Pulses: Normal pulses.      Heart sounds: Normal heart sounds.   Pulmonary:      Effort: Pulmonary effort is normal. No respiratory distress.      Breath sounds: Normal breath sounds.   Skin:     General: Skin is warm and dry.   Neurological:      General: No focal deficit present.      Mental Status: He is alert and oriented to person, place, and time.      Motor: No weakness.      Coordination: Coordination normal.   Psychiatric:         Mood and Affect: Mood normal.         Behavior: Behavior normal.         Thought " Content: Thought content normal.         Judgment: Judgment normal.          Results:     Recent Results (from the past 14 weeks)   CBC & Differential    Collection Time: 12/02/24  8:47 AM    Specimen: Arm, Right; Blood    Blood  Manual Differen   Result Value Ref Range    WBC 5.0 3.4 - 10.8 x10E3/uL    RBC 4.35 4.14 - 5.80 x10E6/uL    Hemoglobin 13.4 13.0 - 17.7 g/dL    Hematocrit 40.8 37.5 - 51.0 %    MCV 94 79 - 97 fL    MCH 30.8 26.6 - 33.0 pg    MCHC 32.8 31.5 - 35.7 g/dL    RDW 12.6 11.6 - 15.4 %    Platelets 248 150 - 450 x10E3/uL    Neutrophil Rel % 56 Not Estab. %    Lymphocyte Rel % 27 Not Estab. %    Monocyte Rel % 12 Not Estab. %    Eosinophil Rel % 4 Not Estab. %    Basophil Rel % 1 Not Estab. %    Neutrophils Absolute 2.8 1.4 - 7.0 x10E3/uL    Lymphocytes Absolute 1.3 0.7 - 3.1 x10E3/uL    Monocytes Absolute 0.6 0.1 - 0.9 x10E3/uL    Eosinophils Absolute 0.2 0.0 - 0.4 x10E3/uL    Basophils Absolute 0.0 0.0 - 0.2 x10E3/uL    Immature Granulocyte Rel % 0 Not Estab. %    Immature Grans Absolute 0.0 0.0 - 0.1 x10E3/uL   HARMONY Comprehensive Plus Profile    Collection Time: 12/02/24  8:47 AM    Specimen: Arm, Right; Blood    Blood  Manual Differen   Result Value Ref Range    Anti-DNA (DS) Ab Qn <1 0 - 9 IU/mL    RNP Antibodies 5.3 (H) 0.0 - 0.9 AI    Urrutia Antibodies <0.2 0.0 - 0.9 AI    Urrutia/RNP Antibodies <0.2 0.0 - 0.9 AI    Antiscleroderma-70 Antibodies <0.2 0.0 - 0.9 AI    Sjogren's Anti-SS-A <0.2 0.0 - 0.9 AI    Sjogren's Anti-SS-B <0.2 0.0 - 0.9 AI    Antichromatin Antibodies <0.2 0.0 - 0.9 AI    Antiribosomal P Antibodies <0.2 0.0 - 0.9 AI    RONAL-1 IgG <0.2 0.0 - 0.9 AI    Anti-Centromere B Antibodies <0.2 0.0 - 0.9 AI    See below: Comment    Sedimentation Rate    Collection Time: 12/02/24  8:47 AM    Specimen: Arm, Right; Blood    Blood  Manual Differen   Result Value Ref Range    Sed Rate 6 0 - 30 mm/hr   Rheumatoid Factor    Collection Time: 12/02/24  8:47 AM    Specimen: Arm, Right; Blood    Blood   Manual Differen   Result Value Ref Range    RA Latex Turbid 10.3 <14.0 IU/mL   C-reactive Protein    Collection Time: 12/02/24  8:47 AM    Specimen: Arm, Right; Blood    Blood  Manual Differen   Result Value Ref Range    C-Reactive Protein <1 0 - 10 mg/L   Comprehensive Metabolic Panel    Collection Time: 12/02/24  8:47 AM    Specimen: Arm, Right; Blood    Blood  Manual Differen   Result Value Ref Range    Glucose 103 (H) 70 - 99 mg/dL    BUN 21 6 - 24 mg/dL    Creatinine 1.13 0.76 - 1.27 mg/dL    EGFR Result 79 >59 mL/min/1.73    BUN/Creatinine Ratio 19 9 - 20    Sodium 137 134 - 144 mmol/L    Potassium 4.6 3.5 - 5.2 mmol/L    Chloride 101 96 - 106 mmol/L    Total CO2 24 20 - 29 mmol/L    Calcium 9.5 8.7 - 10.2 mg/dL    Total Protein 7.1 6.0 - 8.5 g/dL    Albumin 4.3 3.8 - 4.9 g/dL    Globulin 2.8 1.5 - 4.5 g/dL    Total Bilirubin 0.4 0.0 - 1.2 mg/dL    Alkaline Phosphatase 55 44 - 121 IU/L    AST (SGOT) 27 0 - 40 IU/L    ALT (SGPT) 21 0 - 44 IU/L   Hemoglobin A1c    Collection Time: 12/02/24  8:47 AM    Specimen: Arm, Right; Blood    Blood  Manual Differen   Result Value Ref Range    Hemoglobin A1C 5.9 (H) 4.8 - 5.6 %   Lipid Panel    Collection Time: 12/02/24  8:47 AM    Specimen: Arm, Right; Blood    Blood  Manual Differen   Result Value Ref Range    Total Cholesterol 184 100 - 199 mg/dL    Triglycerides 66 0 - 149 mg/dL    HDL Cholesterol 56 >39 mg/dL    VLDL Cholesterol Mart 12 5 - 40 mg/dL    LDL Chol Calc (NIH) 116 (H) 0 - 99 mg/dL   Thyroid Cascade Profile    Collection Time: 12/02/24  8:47 AM    Specimen: Arm, Right; Blood    Blood  Manual Differen   Result Value Ref Range    TSH 1.520 0.450 - 4.500 uIU/mL       Assessment and Plan       Diagnoses and all orders for this visit:    1. Cervicogenic headache (Primary)  Assessment & Plan:  Headaches are likely related to cervicalgia. Proposed MRI of the cervical spine pending his decision after consulting with his wife. Referral to a rheumatologist for further  evaluation of joint pain.      2. Polyarthralgia  Assessment & Plan:  Burning pain in left hip and shoulder, sometimes waking him at night. Discussed interventional pain management instead of pain pills. Will decide with his wife whether to proceed.    Orders:  -     Ambulatory Referral to Rheumatology    3. Positive HARMONY (antinuclear antibody)  Comments:  Recommend rheumatology due to joint symptoms and positive HARMONY.  He agrees to consider rheumatology referral.  Orders:  -     Ambulatory Referral to Rheumatology    4. Prediabetes  Assessment & Plan:  A1c levels in prediabetic range. Advised to monitor diet and increase physical activity to manage weight gain from winter inactivity.          Follow Up  Return in about 3 months (around 6/3/2025) for next scheduled follow up on neck and HLD.    Shahnaz Hawkins PA-C  [facility]

## 2025-03-03 NOTE — ASSESSMENT & PLAN NOTE
Headaches are likely related to cervicalgia. Proposed MRI of the cervical spine pending his decision after consulting with his wife. Referral to a rheumatologist for further evaluation of joint pain.

## 2025-03-04 NOTE — ASSESSMENT & PLAN NOTE
A1c levels in prediabetic range. Advised to monitor diet and increase physical activity to manage weight gain from winter inactivity.

## 2025-03-04 NOTE — ASSESSMENT & PLAN NOTE
Burning pain in left hip and shoulder, sometimes waking him at night. Discussed interventional pain management instead of pain pills. Will decide with his wife whether to proceed.

## 2025-03-08 DIAGNOSIS — F32.A ANXIETY AND DEPRESSION: ICD-10-CM

## 2025-03-08 DIAGNOSIS — F41.9 ANXIETY AND DEPRESSION: ICD-10-CM

## 2025-03-10 NOTE — TELEPHONE ENCOUNTER
Last office visit 03-  Next office visit 06-    Rx Refill Note  Requested Prescriptions     Pending Prescriptions Disp Refills    FLUoxetine (PROzac) 20 MG capsule [Pharmacy Med Name: FLUoxetine HCl 20 MG Oral Capsule] 240 capsule 0     Sig: TAKE 3 CAPSULES BY MOUTH ONCE DAILY IN THE MORNING      Last office visit with prescribing clinician: 3/3/2025   Last telemedicine visit with prescribing clinician: Visit date not found   Next office visit with prescribing clinician: 6/4/2025                         Would you like a call back once the refill request has been completed: [] Yes [] No    If the office needs to give you a call back, can they leave a voicemail: [] Yes [] No    Deloris Rand MA  03/10/25, 08:33 EDT

## 2025-03-21 DIAGNOSIS — E78.2 MIXED HYPERLIPIDEMIA: ICD-10-CM

## 2025-03-21 RX ORDER — ROSUVASTATIN CALCIUM 20 MG/1
20 TABLET, COATED ORAL DAILY
Qty: 90 TABLET | Refills: 0 | Status: SHIPPED | OUTPATIENT
Start: 2025-03-21

## 2025-05-17 DIAGNOSIS — I10 PRIMARY HYPERTENSION: ICD-10-CM

## 2025-05-17 RX ORDER — LOSARTAN POTASSIUM AND HYDROCHLOROTHIAZIDE 12.5; 5 MG/1; MG/1
1 TABLET ORAL DAILY
Qty: 90 TABLET | Refills: 0 | Status: SHIPPED | OUTPATIENT
Start: 2025-05-17

## 2025-06-08 DIAGNOSIS — F32.A ANXIETY AND DEPRESSION: ICD-10-CM

## 2025-06-08 DIAGNOSIS — F41.9 ANXIETY AND DEPRESSION: ICD-10-CM

## 2025-07-11 DIAGNOSIS — F41.9 ANXIETY AND DEPRESSION: ICD-10-CM

## 2025-07-11 DIAGNOSIS — F32.A ANXIETY AND DEPRESSION: ICD-10-CM

## 2025-07-29 DIAGNOSIS — F41.9 ANXIETY AND DEPRESSION: ICD-10-CM

## 2025-07-29 DIAGNOSIS — F32.A ANXIETY AND DEPRESSION: ICD-10-CM

## 2025-08-10 DIAGNOSIS — F32.A ANXIETY AND DEPRESSION: ICD-10-CM

## 2025-08-10 DIAGNOSIS — F41.9 ANXIETY AND DEPRESSION: ICD-10-CM

## 2025-08-15 DIAGNOSIS — I10 PRIMARY HYPERTENSION: ICD-10-CM

## 2025-08-15 RX ORDER — LOSARTAN POTASSIUM AND HYDROCHLOROTHIAZIDE 12.5; 5 MG/1; MG/1
1 TABLET ORAL DAILY
Qty: 90 TABLET | Refills: 0 | Status: SHIPPED | OUTPATIENT
Start: 2025-08-15

## 2025-08-26 DIAGNOSIS — E78.2 MIXED HYPERLIPIDEMIA: ICD-10-CM

## 2025-08-26 RX ORDER — ROSUVASTATIN CALCIUM 20 MG/1
20 TABLET, COATED ORAL DAILY
Qty: 90 TABLET | Refills: 0 | Status: SHIPPED | OUTPATIENT
Start: 2025-08-26